# Patient Record
Sex: FEMALE | Race: WHITE | NOT HISPANIC OR LATINO | Employment: OTHER | URBAN - METROPOLITAN AREA
[De-identification: names, ages, dates, MRNs, and addresses within clinical notes are randomized per-mention and may not be internally consistent; named-entity substitution may affect disease eponyms.]

---

## 2023-08-30 ENCOUNTER — HOSPITAL ENCOUNTER (INPATIENT)
Facility: HOSPITAL | Age: 88
LOS: 2 days | Discharge: NURSING, REHAB OR LONG-TERM CARE | DRG: 070 | End: 2023-09-02
Attending: EMERGENCY MEDICINE | Admitting: INTERNAL MEDICINE
Payer: COMMERCIAL

## 2023-08-30 DIAGNOSIS — T79.6XXA TRAUMATIC RHABDOMYOLYSIS, INITIAL ENCOUNTER (H): ICD-10-CM

## 2023-08-30 DIAGNOSIS — R63.6 UNDERWEIGHT: Primary | ICD-10-CM

## 2023-08-30 DIAGNOSIS — L89.109 PRESSURE INJURY OF SKIN OF BACK, UNSPECIFIED INJURY STAGE: ICD-10-CM

## 2023-08-30 DIAGNOSIS — I48.91 ATRIAL FIBRILLATION WITH RVR (H): ICD-10-CM

## 2023-08-30 DIAGNOSIS — R93.89 ABNORMAL CT OF THE CHEST: ICD-10-CM

## 2023-08-30 DIAGNOSIS — K44.9 HIATAL HERNIA: ICD-10-CM

## 2023-08-30 DIAGNOSIS — E04.1 THYROID NODULE: ICD-10-CM

## 2023-08-30 DIAGNOSIS — D72.829 LEUKOCYTOSIS, UNSPECIFIED TYPE: ICD-10-CM

## 2023-08-30 DIAGNOSIS — K80.20 GALLSTONES: ICD-10-CM

## 2023-08-30 LAB
ABO/RH(D): NORMAL
ALBUMIN SERPL BCG-MCNC: 4 G/DL (ref 3.5–5.2)
ALP SERPL-CCNC: 62 U/L (ref 35–104)
ALT SERPL W P-5'-P-CCNC: 26 U/L (ref 0–50)
ANION GAP SERPL CALCULATED.3IONS-SCNC: 13 MMOL/L (ref 7–15)
ANTIBODY SCREEN: NEGATIVE
AST SERPL W P-5'-P-CCNC: 94 U/L (ref 0–45)
BASOPHILS # BLD AUTO: 0 10E3/UL (ref 0–0.2)
BASOPHILS NFR BLD AUTO: 0 %
BILIRUB DIRECT SERPL-MCNC: 0.5 MG/DL (ref 0–0.3)
BILIRUB SERPL-MCNC: 2.3 MG/DL
BUN SERPL-MCNC: 73 MG/DL (ref 8–23)
CALCIUM SERPL-MCNC: 9.4 MG/DL (ref 8.2–9.6)
CHLORIDE SERPL-SCNC: 105 MMOL/L (ref 98–107)
CREAT SERPL-MCNC: 1.09 MG/DL (ref 0.51–0.95)
DEPRECATED HCO3 PLAS-SCNC: 26 MMOL/L (ref 22–29)
EOSINOPHIL # BLD AUTO: 0 10E3/UL (ref 0–0.7)
EOSINOPHIL NFR BLD AUTO: 0 %
ERYTHROCYTE [DISTWIDTH] IN BLOOD BY AUTOMATED COUNT: 15 % (ref 10–15)
GFR SERPL CREATININE-BSD FRML MDRD: 47 ML/MIN/1.73M2
GLUCOSE SERPL-MCNC: 120 MG/DL (ref 70–99)
HCT VFR BLD AUTO: 41.8 % (ref 35–47)
HGB BLD-MCNC: 13.4 G/DL (ref 11.7–15.7)
HOLD SPECIMEN: NORMAL
IMM GRANULOCYTES # BLD: 0.1 10E3/UL
IMM GRANULOCYTES NFR BLD: 1 %
LYMPHOCYTES # BLD AUTO: 0.4 10E3/UL (ref 0.8–5.3)
LYMPHOCYTES NFR BLD AUTO: 3 %
MAGNESIUM SERPL-MCNC: 2.6 MG/DL (ref 1.7–2.3)
MCH RBC QN AUTO: 30 PG (ref 26.5–33)
MCHC RBC AUTO-ENTMCNC: 32.1 G/DL (ref 31.5–36.5)
MCV RBC AUTO: 94 FL (ref 78–100)
MONOCYTES # BLD AUTO: 1 10E3/UL (ref 0–1.3)
MONOCYTES NFR BLD AUTO: 7 %
NEUTROPHILS # BLD AUTO: 11.6 10E3/UL (ref 1.6–8.3)
NEUTROPHILS NFR BLD AUTO: 89 %
NRBC # BLD AUTO: 0 10E3/UL
NRBC BLD AUTO-RTO: 0 /100
PLATELET # BLD AUTO: 227 10E3/UL (ref 150–450)
POTASSIUM SERPL-SCNC: 4.6 MMOL/L (ref 3.4–5.3)
PROT SERPL-MCNC: 6.9 G/DL (ref 6.4–8.3)
RBC # BLD AUTO: 4.46 10E6/UL (ref 3.8–5.2)
SODIUM SERPL-SCNC: 144 MMOL/L (ref 136–145)
SPECIMEN EXPIRATION DATE: NORMAL
TROPONIN T SERPL HS-MCNC: 60 NG/L
WBC # BLD AUTO: 13.1 10E3/UL (ref 4–11)

## 2023-08-30 PROCEDURE — 84484 ASSAY OF TROPONIN QUANT: CPT | Performed by: EMERGENCY MEDICINE

## 2023-08-30 PROCEDURE — 82550 ASSAY OF CK (CPK): CPT | Performed by: EMERGENCY MEDICINE

## 2023-08-30 PROCEDURE — 86850 RBC ANTIBODY SCREEN: CPT | Performed by: EMERGENCY MEDICINE

## 2023-08-30 PROCEDURE — 93005 ELECTROCARDIOGRAM TRACING: CPT | Performed by: EMERGENCY MEDICINE

## 2023-08-30 PROCEDURE — 93005 ELECTROCARDIOGRAM TRACING: CPT | Performed by: STUDENT IN AN ORGANIZED HEALTH CARE EDUCATION/TRAINING PROGRAM

## 2023-08-30 PROCEDURE — 82248 BILIRUBIN DIRECT: CPT | Performed by: EMERGENCY MEDICINE

## 2023-08-30 PROCEDURE — 83735 ASSAY OF MAGNESIUM: CPT | Performed by: EMERGENCY MEDICINE

## 2023-08-30 PROCEDURE — 80053 COMPREHEN METABOLIC PANEL: CPT | Performed by: EMERGENCY MEDICINE

## 2023-08-30 PROCEDURE — 85025 COMPLETE CBC W/AUTO DIFF WBC: CPT | Performed by: EMERGENCY MEDICINE

## 2023-08-30 PROCEDURE — 36415 COLL VENOUS BLD VENIPUNCTURE: CPT | Performed by: EMERGENCY MEDICINE

## 2023-08-30 PROCEDURE — 82310 ASSAY OF CALCIUM: CPT | Performed by: EMERGENCY MEDICINE

## 2023-08-30 RX ORDER — METOPROLOL TARTRATE 1 MG/ML
5 INJECTION, SOLUTION INTRAVENOUS ONCE
Status: COMPLETED | OUTPATIENT
Start: 2023-08-31 | End: 2023-08-31

## 2023-08-30 ASSESSMENT — ACTIVITIES OF DAILY LIVING (ADL): ADLS_ACUITY_SCORE: 35

## 2023-08-31 ENCOUNTER — APPOINTMENT (OUTPATIENT)
Dept: CT IMAGING | Facility: HOSPITAL | Age: 88
DRG: 070 | End: 2023-08-31
Attending: EMERGENCY MEDICINE
Payer: COMMERCIAL

## 2023-08-31 ENCOUNTER — APPOINTMENT (OUTPATIENT)
Dept: CARDIOLOGY | Facility: HOSPITAL | Age: 88
DRG: 070 | End: 2023-08-31
Attending: INTERNAL MEDICINE
Payer: COMMERCIAL

## 2023-08-31 ENCOUNTER — APPOINTMENT (OUTPATIENT)
Dept: PHYSICAL THERAPY | Facility: HOSPITAL | Age: 88
DRG: 070 | End: 2023-08-31
Attending: HOSPITALIST
Payer: COMMERCIAL

## 2023-08-31 ENCOUNTER — APPOINTMENT (OUTPATIENT)
Dept: OCCUPATIONAL THERAPY | Facility: HOSPITAL | Age: 88
DRG: 070 | End: 2023-08-31
Attending: HOSPITALIST
Payer: COMMERCIAL

## 2023-08-31 ENCOUNTER — APPOINTMENT (OUTPATIENT)
Dept: ULTRASOUND IMAGING | Facility: HOSPITAL | Age: 88
DRG: 070 | End: 2023-08-31
Attending: INTERNAL MEDICINE
Payer: COMMERCIAL

## 2023-08-31 PROBLEM — R93.89 ABNORMAL CT OF THE CHEST: Status: ACTIVE | Noted: 2023-08-31

## 2023-08-31 PROBLEM — T79.6XXA TRAUMATIC RHABDOMYOLYSIS, INITIAL ENCOUNTER (H): Status: ACTIVE | Noted: 2023-08-31

## 2023-08-31 PROBLEM — K44.9 HIATAL HERNIA: Status: ACTIVE | Noted: 2023-08-31

## 2023-08-31 PROBLEM — K80.20 GALLSTONES: Status: ACTIVE | Noted: 2023-08-31

## 2023-08-31 PROBLEM — E04.1 THYROID NODULE: Status: ACTIVE | Noted: 2023-08-31

## 2023-08-31 PROBLEM — I48.91 ATRIAL FIBRILLATION WITH RVR (H): Status: ACTIVE | Noted: 2023-08-31

## 2023-08-31 PROBLEM — D72.829 LEUKOCYTOSIS, UNSPECIFIED TYPE: Status: ACTIVE | Noted: 2023-08-31

## 2023-08-31 PROBLEM — L89.109: Status: ACTIVE | Noted: 2023-08-31

## 2023-08-31 LAB
ALBUMIN SERPL BCG-MCNC: 3.1 G/DL (ref 3.5–5.2)
ALBUMIN UR-MCNC: 30 MG/DL
ALP SERPL-CCNC: 44 U/L (ref 35–104)
ALT SERPL W P-5'-P-CCNC: 26 U/L (ref 0–50)
ANION GAP SERPL CALCULATED.3IONS-SCNC: 10 MMOL/L (ref 7–15)
ANION GAP SERPL CALCULATED.3IONS-SCNC: 9 MMOL/L (ref 7–15)
APPEARANCE UR: CLEAR
AST SERPL W P-5'-P-CCNC: 85 U/L (ref 0–45)
BACTERIA #/AREA URNS HPF: ABNORMAL /HPF
BASOPHILS # BLD AUTO: 0 10E3/UL (ref 0–0.2)
BASOPHILS NFR BLD AUTO: 0 %
BILIRUB SERPL-MCNC: 1.7 MG/DL
BILIRUB UR QL STRIP: NEGATIVE
BUN SERPL-MCNC: 66.1 MG/DL (ref 8–23)
BUN SERPL-MCNC: 69.1 MG/DL (ref 8–23)
CALCIUM SERPL-MCNC: 8.4 MG/DL (ref 8.2–9.6)
CALCIUM SERPL-MCNC: 8.6 MG/DL (ref 8.2–9.6)
CHLORIDE SERPL-SCNC: 106 MMOL/L (ref 98–107)
CHLORIDE SERPL-SCNC: 111 MMOL/L (ref 98–107)
CK SERPL-CCNC: 1969 U/L (ref 26–192)
CK SERPL-CCNC: 2572 U/L (ref 26–192)
COLOR UR AUTO: YELLOW
CREAT SERPL-MCNC: 0.88 MG/DL (ref 0.51–0.95)
CREAT SERPL-MCNC: 1.04 MG/DL (ref 0.51–0.95)
DEPRECATED HCO3 PLAS-SCNC: 24 MMOL/L (ref 22–29)
DEPRECATED HCO3 PLAS-SCNC: 27 MMOL/L (ref 22–29)
EOSINOPHIL # BLD AUTO: 0 10E3/UL (ref 0–0.7)
EOSINOPHIL NFR BLD AUTO: 0 %
ERYTHROCYTE [DISTWIDTH] IN BLOOD BY AUTOMATED COUNT: 14.9 % (ref 10–15)
GFR SERPL CREATININE-BSD FRML MDRD: 49 ML/MIN/1.73M2
GFR SERPL CREATININE-BSD FRML MDRD: 60 ML/MIN/1.73M2
GLUCOSE SERPL-MCNC: 105 MG/DL (ref 70–99)
GLUCOSE SERPL-MCNC: 124 MG/DL (ref 70–99)
GLUCOSE UR STRIP-MCNC: NEGATIVE MG/DL
HCT VFR BLD AUTO: 38.3 % (ref 35–47)
HGB BLD-MCNC: 12.1 G/DL (ref 11.7–15.7)
HGB UR QL STRIP: ABNORMAL
IMM GRANULOCYTES # BLD: 0 10E3/UL
IMM GRANULOCYTES NFR BLD: 0 %
KETONES UR STRIP-MCNC: ABNORMAL MG/DL
LACTATE SERPL-SCNC: 1.5 MMOL/L (ref 0.7–2)
LEUKOCYTE ESTERASE UR QL STRIP: ABNORMAL
LVEF ECHO: NORMAL
LYMPHOCYTES # BLD AUTO: 0.5 10E3/UL (ref 0.8–5.3)
LYMPHOCYTES NFR BLD AUTO: 6 %
MAGNESIUM SERPL-MCNC: 2.4 MG/DL (ref 1.7–2.3)
MCH RBC QN AUTO: 29.9 PG (ref 26.5–33)
MCHC RBC AUTO-ENTMCNC: 31.6 G/DL (ref 31.5–36.5)
MCV RBC AUTO: 95 FL (ref 78–100)
MONOCYTES # BLD AUTO: 1 10E3/UL (ref 0–1.3)
MONOCYTES NFR BLD AUTO: 10 %
MUCOUS THREADS #/AREA URNS LPF: PRESENT /LPF
NEUTROPHILS # BLD AUTO: 8 10E3/UL (ref 1.6–8.3)
NEUTROPHILS NFR BLD AUTO: 84 %
NITRATE UR QL: POSITIVE
NRBC # BLD AUTO: 0 10E3/UL
NRBC BLD AUTO-RTO: 0 /100
PH UR STRIP: 5.5 [PH] (ref 5–7)
PLATELET # BLD AUTO: 174 10E3/UL (ref 150–450)
POTASSIUM SERPL-SCNC: 4.2 MMOL/L (ref 3.4–5.3)
POTASSIUM SERPL-SCNC: 4.4 MMOL/L (ref 3.4–5.3)
PROT SERPL-MCNC: 5.6 G/DL (ref 6.4–8.3)
RBC # BLD AUTO: 4.05 10E6/UL (ref 3.8–5.2)
RBC URINE: 9 /HPF
SODIUM SERPL-SCNC: 142 MMOL/L (ref 136–145)
SODIUM SERPL-SCNC: 145 MMOL/L (ref 136–145)
SP GR UR STRIP: >1.03 (ref 1–1.03)
SQUAMOUS EPITHELIAL: 1 /HPF
TROPONIN T SERPL HS-MCNC: 55 NG/L
UROBILINOGEN UR STRIP-MCNC: <2 MG/DL
WBC # BLD AUTO: 9.6 10E3/UL (ref 4–11)
WBC URINE: 109 /HPF

## 2023-08-31 PROCEDURE — 97162 PT EVAL MOD COMPLEX 30 MIN: CPT | Mod: GP

## 2023-08-31 PROCEDURE — 84484 ASSAY OF TROPONIN QUANT: CPT | Performed by: EMERGENCY MEDICINE

## 2023-08-31 PROCEDURE — 93306 TTE W/DOPPLER COMPLETE: CPT

## 2023-08-31 PROCEDURE — 93005 ELECTROCARDIOGRAM TRACING: CPT | Performed by: HOSPITALIST

## 2023-08-31 PROCEDURE — 250N000009 HC RX 250: Performed by: EMERGENCY MEDICINE

## 2023-08-31 PROCEDURE — 76705 ECHO EXAM OF ABDOMEN: CPT

## 2023-08-31 PROCEDURE — 36415 COLL VENOUS BLD VENIPUNCTURE: CPT | Performed by: EMERGENCY MEDICINE

## 2023-08-31 PROCEDURE — 93306 TTE W/DOPPLER COMPLETE: CPT | Mod: 26 | Performed by: INTERNAL MEDICINE

## 2023-08-31 PROCEDURE — G0463 HOSPITAL OUTPT CLINIC VISIT: HCPCS | Mod: 25

## 2023-08-31 PROCEDURE — 76536 US EXAM OF HEAD AND NECK: CPT

## 2023-08-31 PROCEDURE — 258N000003 HC RX IP 258 OP 636: Performed by: INTERNAL MEDICINE

## 2023-08-31 PROCEDURE — 85025 COMPLETE CBC W/AUTO DIFF WBC: CPT | Performed by: INTERNAL MEDICINE

## 2023-08-31 PROCEDURE — 97167 OT EVAL HIGH COMPLEX 60 MIN: CPT | Mod: GO

## 2023-08-31 PROCEDURE — 87186 SC STD MICRODIL/AGAR DIL: CPT | Performed by: EMERGENCY MEDICINE

## 2023-08-31 PROCEDURE — 82550 ASSAY OF CK (CPK): CPT | Performed by: HOSPITALIST

## 2023-08-31 PROCEDURE — 83735 ASSAY OF MAGNESIUM: CPT | Performed by: HOSPITALIST

## 2023-08-31 PROCEDURE — 97530 THERAPEUTIC ACTIVITIES: CPT | Mod: GP

## 2023-08-31 PROCEDURE — 258N000003 HC RX IP 258 OP 636: Performed by: EMERGENCY MEDICINE

## 2023-08-31 PROCEDURE — 83605 ASSAY OF LACTIC ACID: CPT | Performed by: EMERGENCY MEDICINE

## 2023-08-31 PROCEDURE — 120N000001 HC R&B MED SURG/OB

## 2023-08-31 PROCEDURE — 87040 BLOOD CULTURE FOR BACTERIA: CPT | Performed by: EMERGENCY MEDICINE

## 2023-08-31 PROCEDURE — 72125 CT NECK SPINE W/O DYE: CPT

## 2023-08-31 PROCEDURE — 999N000104 CT THORACIC SPINE RECONSTRUCTED

## 2023-08-31 PROCEDURE — 80053 COMPREHEN METABOLIC PANEL: CPT | Performed by: INTERNAL MEDICINE

## 2023-08-31 PROCEDURE — 36415 COLL VENOUS BLD VENIPUNCTURE: CPT | Performed by: INTERNAL MEDICINE

## 2023-08-31 PROCEDURE — 81001 URINALYSIS AUTO W/SCOPE: CPT | Performed by: EMERGENCY MEDICINE

## 2023-08-31 PROCEDURE — 36415 COLL VENOUS BLD VENIPUNCTURE: CPT | Performed by: HOSPITALIST

## 2023-08-31 PROCEDURE — 99223 1ST HOSP IP/OBS HIGH 75: CPT | Performed by: INTERNAL MEDICINE

## 2023-08-31 PROCEDURE — 74177 CT ABD & PELVIS W/CONTRAST: CPT

## 2023-08-31 PROCEDURE — 250N000011 HC RX IP 250 OP 636: Mod: JZ | Performed by: EMERGENCY MEDICINE

## 2023-08-31 PROCEDURE — 250N000013 HC RX MED GY IP 250 OP 250 PS 637: Performed by: INTERNAL MEDICINE

## 2023-08-31 PROCEDURE — 999N000104 CT LUMBAR SPINE RECONSTRUCTED

## 2023-08-31 PROCEDURE — 250N000011 HC RX IP 250 OP 636: Mod: JZ | Performed by: INTERNAL MEDICINE

## 2023-08-31 PROCEDURE — 250N000013 HC RX MED GY IP 250 OP 250 PS 637: Performed by: HOSPITALIST

## 2023-08-31 PROCEDURE — 70450 CT HEAD/BRAIN W/O DYE: CPT

## 2023-08-31 RX ORDER — ONDANSETRON 2 MG/ML
4 INJECTION INTRAMUSCULAR; INTRAVENOUS EVERY 6 HOURS PRN
Status: DISCONTINUED | OUTPATIENT
Start: 2023-08-31 | End: 2023-09-02 | Stop reason: HOSPADM

## 2023-08-31 RX ORDER — LIDOCAINE 40 MG/G
CREAM TOPICAL
Status: DISCONTINUED | OUTPATIENT
Start: 2023-08-31 | End: 2023-09-02 | Stop reason: HOSPADM

## 2023-08-31 RX ORDER — MULTIPLE VITAMINS W/ MINERALS TAB 9MG-400MCG
1 TAB ORAL DAILY
Status: DISCONTINUED | OUTPATIENT
Start: 2023-08-31 | End: 2023-09-02 | Stop reason: HOSPADM

## 2023-08-31 RX ORDER — ACETAMINOPHEN 325 MG/1
650 TABLET ORAL EVERY 6 HOURS PRN
Status: DISCONTINUED | OUTPATIENT
Start: 2023-08-31 | End: 2023-09-02 | Stop reason: HOSPADM

## 2023-08-31 RX ORDER — ONDANSETRON 4 MG/1
4 TABLET, ORALLY DISINTEGRATING ORAL EVERY 6 HOURS PRN
Status: DISCONTINUED | OUTPATIENT
Start: 2023-08-31 | End: 2023-09-02 | Stop reason: HOSPADM

## 2023-08-31 RX ORDER — IOPAMIDOL 755 MG/ML
75 INJECTION, SOLUTION INTRAVASCULAR ONCE
Status: COMPLETED | OUTPATIENT
Start: 2023-08-31 | End: 2023-08-31

## 2023-08-31 RX ORDER — AMOXICILLIN 250 MG
1 CAPSULE ORAL 2 TIMES DAILY PRN
Status: DISCONTINUED | OUTPATIENT
Start: 2023-08-31 | End: 2023-09-02 | Stop reason: HOSPADM

## 2023-08-31 RX ORDER — METOPROLOL SUCCINATE 25 MG/1
25 TABLET, EXTENDED RELEASE ORAL DAILY
Status: DISCONTINUED | OUTPATIENT
Start: 2023-09-01 | End: 2023-09-01

## 2023-08-31 RX ORDER — SODIUM CHLORIDE 9 MG/ML
INJECTION, SOLUTION INTRAVENOUS CONTINUOUS
Status: ACTIVE | OUTPATIENT
Start: 2023-08-31 | End: 2023-09-01

## 2023-08-31 RX ORDER — AMOXICILLIN 250 MG
2 CAPSULE ORAL 2 TIMES DAILY PRN
Status: DISCONTINUED | OUTPATIENT
Start: 2023-08-31 | End: 2023-09-02 | Stop reason: HOSPADM

## 2023-08-31 RX ORDER — ZINC SULFATE 50(220)MG
220 CAPSULE ORAL DAILY
Status: DISCONTINUED | OUTPATIENT
Start: 2023-08-31 | End: 2023-09-02 | Stop reason: HOSPADM

## 2023-08-31 RX ORDER — ENOXAPARIN SODIUM 100 MG/ML
30 INJECTION SUBCUTANEOUS EVERY 24 HOURS
Status: DISCONTINUED | OUTPATIENT
Start: 2023-08-31 | End: 2023-08-31

## 2023-08-31 RX ADMIN — METOPROLOL TARTRATE 5 MG: 5 INJECTION INTRAVENOUS at 00:03

## 2023-08-31 RX ADMIN — SODIUM CHLORIDE: 9 INJECTION, SOLUTION INTRAVENOUS at 04:35

## 2023-08-31 RX ADMIN — ZINC SULFATE 220 MG (50 MG) CAPSULE 220 MG: CAPSULE at 14:11

## 2023-08-31 RX ADMIN — SODIUM CHLORIDE 500 ML: 9 INJECTION, SOLUTION INTRAVENOUS at 00:07

## 2023-08-31 RX ADMIN — APIXABAN 2.5 MG: 2.5 TABLET, FILM COATED ORAL at 20:43

## 2023-08-31 RX ADMIN — METOPROLOL SUCCINATE 12.5 MG: 25 TABLET, EXTENDED RELEASE ORAL at 09:04

## 2023-08-31 RX ADMIN — IOPAMIDOL 75 ML: 755 INJECTION, SOLUTION INTRAVENOUS at 01:34

## 2023-08-31 RX ADMIN — Medication 50 MG: at 14:11

## 2023-08-31 RX ADMIN — ENOXAPARIN SODIUM 30 MG: 30 INJECTION SUBCUTANEOUS at 09:04

## 2023-08-31 RX ADMIN — Medication 1 TABLET: at 14:11

## 2023-08-31 ASSESSMENT — ACTIVITIES OF DAILY LIVING (ADL)
ADLS_ACUITY_SCORE: 35
ADLS_ACUITY_SCORE: 49
DEPENDENT_IADLS:: CLEANING;COOKING;LAUNDRY;SHOPPING;TRANSPORTATION
ADLS_ACUITY_SCORE: 35
ADLS_ACUITY_SCORE: 49
ADLS_ACUITY_SCORE: 35
ADLS_ACUITY_SCORE: 35
ADLS_ACUITY_SCORE: 49
ADLS_ACUITY_SCORE: 35
ADLS_ACUITY_SCORE: 35
ADLS_ACUITY_SCORE: 37
ADLS_ACUITY_SCORE: 35
ADLS_ACUITY_SCORE: 35

## 2023-08-31 NOTE — ED NOTES
Pt was found to be covered in feces. Pt shoes had layer of stool in them. Toes caked in thick layer of stool and between toes and under toenails. Pt cleaned with soap and wash clothes to loosen feces. Pt received new underwear. Two large sores were found on patients coccyx and left buttock. Barrier cream applied. Pt repositioned and provided all new clothes. Straight catheterization attempted x3, unsuccessful. Provider notified.

## 2023-08-31 NOTE — PROGRESS NOTES
Hospital Medicine Update Progress Note    Patient admitted after midnight, please see H+P for full details.    Patient is admitted with atrial fibrillation with RVR and mild rhabdomyolysis after being found sitting on the toilet for an unknown duration.    Patient is on IV fluids for mild rhabdo, renal function stable.  Continue IV fluids for now.    Heart rate is better controlled as well, appears to still be in atrial fibrillation.  We will continue monitor for the next 24 hours.  TTE shows intact EF and some mild AR, MR, TR, and moderate pulmonary hypertension.  We will start on low-dose Eliquis given age and weight.  Continue on metoprolol.    Also given lymphadenopathy findings on CT, spoke with patient and stepson.  Given patient's age felt that monitoring this is probably appropriate rather than pursuing any kind of aggressive work-up.  Explained that if anything is found she will need a biopsy and the treatment if this is aggressive could do more harm than good.  Patient's stepson is in agreement.    Was seen by PT, recommend TCU.    Expect will likely be medically ready by tomorrow.

## 2023-08-31 NOTE — ED NOTES
Bed: JNED-06  Expected date: 8/30/23  Expected time: 9:03 PM  Means of arrival: Ambulance  Comments:  [facility] - failure to thrive

## 2023-08-31 NOTE — PROGRESS NOTES
08/31/23 1415   Appointment Info   Signing Clinician's Name / Credentials (PT) Suad Fleming DPT   Living Environment   People in Home alone   Current Living Arrangements house  ([facility])   Home Accessibility no concerns   Transportation Anticipated family or friend will provide;health plan transportation   Self-Care   Usual Activity Tolerance moderate   Current Activity Tolerance poor   Equipment Currently Used at Home walker, rolling   General Information   Onset of Illness/Injury or Date of Surgery 08/30/23   Referring Physician Michael Biggs MD   Patient/Family Therapy Goals Statement (PT) none   Pertinent History of Current Problem (include personal factors and/or comorbidities that impact the POC) 89 year old female admitted on 8/30/2023. She is admitted for failure to thrive with concern for intermittent A-fib with RVR, rhabdomyolysis and pressure ulcers on the thoracic spine.   Existing Precautions/Restrictions fall   Strength (Manual Muscle Testing)   Strength (Manual Muscle Testing) Deficits observed during functional mobility   Bed Mobility   Bed Mobility supine-sit   Supine-Sit [place] (Bed Mobility) moderate assist (50% patient effort)   Bed Mobility Limitations decreased ability to use arms for pushing/pulling;decreased ability to use legs for bridging/pushing;impaired ability to control trunk for mobility   Transfers   Transfers sit-stand transfer   Sit-Stand Transfer   Sit-Stand [place] (Transfers) minimum assist (75% patient effort)   Assistive Device (Sit-Stand Transfers)   (Hand hold assist)   Gait/Stairs (Locomotion)   [place] Level (Gait) minimum assist (75% patient effort)   Assistive Device (Gait) walker, front-wheeled   Distance in Feet 5'  (bed > recliner)   Pattern (Gait) step-to   Deviations/Abnormal Patterns (Gait) gait speed decreased   Clinical Impression   Criteria for Skilled Therapeutic Intervention Yes, treatment indicated   PT Diagnosis (PT) Impaired  functional mobility   Influenced by the following impairments Weakness, fatigue   Functional limitations due to impairments Impaired strength, transfers, gait   Clinical Presentation (PT Evaluation Complexity) Stable/Uncomplicated   Clinical Presentation Rationale Presents as diagnosed   Clinical Decision Making (Complexity) moderate complexity   Planned Therapy Interventions (PT) balance training;bed mobility training;gait training;home exercise program;strengthening;transfer training   Anticipated Equipment Needs at Discharge (PT) walker, rolling   PT Total Evaluation Time   PT Eval, Moderate Complexity Minutes (94276) 10   Physical Therapy Goals   PT Frequency Daily   PT Predicted Duration/Target Date for Goal Attainment 09/07/23   PT Goals Bed Mobility;Transfers;Gait   PT: Bed Mobility Supervision/stand-by assist;Supine to/from sit   PT: Transfers Supervision/stand-by assist;Sit to/from stand;Bed to/from chair;Assistive device   PT: Gait Rolling walker;50 feet  (CGA)   PT Discharge Planning   PT Plan progress transfers, amb as ifeoma, LE ex   PT Discharge Recommendation (DC Rec) Transitional Care Facility   PT Rationale for DC Rec Unable to ambulate any distance yet.   PT Brief overview of current status Amb 5' from bed > recliner with min A and FWW. Limited by weakness.   Total Session Time   Total Session Time (sum of timed and untimed services) 10       Suad Fleming, PT, DPT  8/31/2023

## 2023-08-31 NOTE — H&P
"[facility]    History and Physical - Hospitalist Service       Date of Admission:  8/30/2023    Assessment & Plan      Elham Manning is a 89 year old female admitted on 8/30/2023. She is admitted for failure to thrive with concern for intermittent A-fib with RVR, rhabdomyolysis and pressure ulcers on the thoracic spine.  Patient has not been to the doctor in years    Acute encephalopathy/confusion  -- Neurochecks  --CT head no acute findings.      A-fib with RVR  Elevated troponin  -- Rate controlled after dose of metoprolol IV in the ER  --Continue p.o. metoprolol 12.5 mg  --Echocardiogram in the morning  --Although troponin is elevated it is not trending up.  --Will defer to daytime physician for with a cardiology consult pending results of echocardiogram      Rhabdomyolysis  -- Continue IV fluids  Trend serum creatinine    Pressure ulcer thoracic spine  --not visualized by   --Wound care consult      CT chest with soft tissue density at thoracic inlet concerning for adenopathy versus lymphoproliferative etiology versus lymphoma  -- We will defer to daytime physician for oncology consult admitted with family.  No family at bedside at the time of interview.       Moderate to severe protein calorie malnutrition with cachexia  --Nutrition consult      Hyperbilirubinemia  Cholelithiasis noted on CT  --Trend for now  --Liver ultrasound      Left thyroid nodule that on CT  -- thyroid sono    Leukocytosis  --We will trend for now         Diet: Combination Diet Regular Diet Adult  DVT Prophylaxis: Enoxaparin (Lovenox) SQ  [place]ummings Catheter: Not present  Lines: None     Cardiac Monitoring: ACTIVE order. Indication: Tachyarrhythmias, acute (48 hours)  Code Status: Full Code    Clinically Significant Risk Factors Present on Admission                       # Cachexia: Estimated body mass index is 15.94 kg/m  as calculated from the following:    Height as of this encounter: 1.6 m (5' 3\").    Weight as of " this encounter: 40.8 kg (90 lb).              Disposition Plan      Expected Discharge Date: 09/02/2023                  Abril Cabral MD  Hospitalist Service  [facility]  Securely message with [employer] (more info)  Text page via [employer] Paging/Directory     ______________________________________________________________________    Chief Complaint   Patient found sitting on the toilet in an awkward position    History is obtained from the ER provider and chart      History of Present Illness   Elham Manning is a 89 year old female who has not been to a doctor in ages for fear of having her 's license revoked was brought to the emergency room secondary to being found sitting on the toilet in an awkward position.  Family had not heard from her in 2 days and they went over to her house and found her sitting on the toilet in an uncomfortable/awkward position.    On arrival here she is confused but oriented to person/self, found to have fecal matter on her legs in between her toes.  It is not able to provide any meaningful history but she is oriented to herself.  She does not know why she came to the hospital.  Per ER provider she was noted to have a pressure ulcer on her thoracic spine felt to be secondary to where she was leaning against a wall/shower when she was found.      Past Medical History    History reviewed. No pertinent past medical history.  Unable to assess secondary to clinical condition    Past Surgical History   History reviewed. No pertinent surgical history.    Prior to Admission Medications   None        Social History   I have reviewed this patient's social history and updated it with pertinent information if needed.         Family History     Unable to obtain due to: Medical condition      Allergies   No Known Allergies     Physical Exam   Vital Signs: Temp: 98.4  F (36.9  C) Temp src: Oral BP: 116/82 Pulse: 92   Resp: 23 SpO2: 96 % O2 Device: None (Room air)    Weight:  90 lbs 0 oz      General: Awake and alert, cachectic frail chronically ill looking  Eyes: Pupils reactive to light  HENT: Atraumatic, oral mucosa moist  Neck: No masses, or swelling  Pulmonary: Good air entry, clear to auscultation  CVS: Heart sounds 1 and 2 present, regular rate with irregular rhythm, no murmur  GI/ Abdomen: Soft , not tender , not distended, bowel sounds ++  : No dorman   ROXANNE: Normal inspection, no muscle spasm  Skin: No rash, skin intact  Extremities: Edema ++, with scabs on the lower extremities  Neuro: Alert and oriented, self only follows command,   Psych: Unable to assess      Medical Decision Making       75 MINUTES SPENT BY ME on the date of service doing chart review, history, exam, documentation & further activities per the note.      Data     I have personally reviewed the following data over the past 24 hrs:    13.1 (H)  \   13.4   / 227     144 105 73.0 (H) /  120 (H)   4.6 26 1.09 (H) \     ALT: 26 AST: 94 (H) AP: 62 TBILI: 2.3 (H)   ALB: 4.0 TOT PROTEIN: 6.9 LIPASE: N/A     Trop: 55 (H) BNP: N/A     Procal: N/A CRP: N/A Lactic Acid: 1.5       Imaging results reviewed over the past 24 hrs:   Recent Results (from the past 24 hour(s))   Head CT w/o contrast    Narrative    EXAM: CT HEAD W/O CONTRAST  LOCATION: [facility]  DATE: 8/31/2023    INDICATION: Traumatic injury. Confusion.  COMPARISON: None.  TECHNIQUE: Routine CT Head without IV contrast. Multiplanar reformats. Dose reduction techniques were used.    FINDINGS:  INTRACRANIAL CONTENTS: No intracranial hemorrhage, extraaxial collection, or mass effect.  No CT evidence of acute infarct. 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 Severe presumed chronic small vessel   ischemic changes. Mild to moderate generalized volume loss. No hydrocephalus.     VISUALIZED ORBITS/SINUSES/MASTOIDS: Prior bilateral cataract surgery. Visualized portions of the orbits are otherwise  unremarkable. No paranasal sinus mucosal disease. No middle ear or mastoid effusion.    BONES/SOFT TISSUES: No acute abnormality.      Impression    IMPRESSION:  1.  No CT evidence for acute intracranial process.  2.  Chronic changes as above.   Cervical spine CT w/o contrast    Narrative    EXAM: CT THORACIC SPINE RECONSTRUCTED, CT LUMBAR SPINE RECONSTRUCTED, CT CERVICAL SPINE W/O CONTRAST  LOCATION: [facility]  DATE: 8/31/2023    INDICATION: Traumatic injury. Fall.  COMPARISON: None.  TECHNIQUE:  1) Routine CT Cervical Spine without IV contrast. Multiplanar reformats. Dose reduction techniques were used.   2) Dedicated axial, sagittal, and coronal images of the Thoracic Spine were generated utilizing CT chest source data and are separately reviewed. Dose reduction techniques were used.   3) Dedicated axial, sagittal, and coronal images of the Lumbar Spine were generated utilizing CT abdomen pelvis source data and are separately reviewed. Dose reduction techniques were used.     FINDINGS:    CERVICAL SPINE CT:  VERTEBRA: Trace degenerative anterolisthesis of C6 on C7. Vertebral body heights are preserved. No evidence of an acute displaced fracture. Diffuse bony demineralization.     CANAL/FORAMINA: Multilevel degenerative changes without high-grade spinal canal stenosis. Multilevel bilateral neural foraminal narrowing with at least moderate bilateral stenosis at C3-C4 and C5-C6.    PARASPINAL: No prevertebral hematoma.     THORACIC SPINE CT:  VERTEBRA: Accentuation of the thoracic spine kyphosis. Multilevel bridging anterior osteophytes with ankylosis of the majority of the thoracic spine. Diffuse bony demineralization. No definite acute displaced fracture.     CANAL/FORAMINA: No high-grade spinal canal stenosis. Scattered mild bilateral neural foraminal narrowing.    PARASPINAL: See separately dictated chest CT.    LUMBAR SPINE CT:  VERTEBRA: Slight left apex curvature of the lumbar spine.  Grade I retrolisthesis of L2 on L3 and anterolisthesis of L5 on S1. Vertebral body heights are preserved. The bones are diffusely demineralized. No evidence of an acute displaced fracture.     CANAL/FORAMINA: Mild degenerative changes of the lumbar spine, without high-grade spinal canal or neural foraminal narrowing.    PARASPINAL: See separately dictated CT abdomen and pelvis.      Impression    IMPRESSION:  CERVICAL SPINE CT:  1.  No evidence of an acute displaced fracture of the cervical spine.    THORACIC SPINE CT:  1.  No evidence of an acute displaced fracture of the thoracic spine.    LUMBAR SPINE CT:  1.  No evidence of an acute displaced fracture of the lumbar spine.   CT Chest/Abdomen/Pelvis w Contrast    Narrative    EXAM: CT CHEST/ABDOMEN/PELVIS W CONTRAST  LOCATION: [facility]  DATE: 8/31/2023    INDICATION: fall. confusion, elevated HR, WBC  COMPARISON: None.  TECHNIQUE: CT scan of the chest, abdomen, and pelvis was performed following injection of IV contrast. Multiplanar reformats were obtained. Dose reduction techniques were used.   CONTRAST: 75ml Isovue 370    FINDINGS:   LUNGS AND PLEURA: Left lower lobe atelectasis adjacent to the patient's large hiatal hernia. No acute airspace infiltrate. No edema. No pneumothorax or pleural effusion.    MEDIASTINUM/AXILLAE: Mild cardiomegaly. Incidental note made of left-sided SVC, normal variant. No pericardial effusion. Large hiatal hernia containing the majority of the stomach. Ectatic thoracic aorta. There is some amorphous soft tissue density at   the leftward aspect of the thoracic inlet extending inferiorly into the prevascular space of the anterior mediastinum, measuring up to 3.2 x 2.0 cm on image 29 of series 4. There is a 1.4 cm hypodense left thyroid lobe nodule.    CORONARY ARTERY CALCIFICATION: Mild.    HEPATOBILIARY: Large gallstones in the gallbladder fundus. No focal liver injury.    PANCREAS: Normal.    SPLEEN:  Normal.    ADRENAL GLANDS: Mild nonspecific adrenal thickening.    KIDNEYS/BLADDER: Benign right renal cortical cysts requiring no follow-up. No hydronephrosis. Bladder is normal.    BOWEL: Severe distal colonic diverticulosis. No free air.    LYMPH NODES: Normal.    VASCULATURE: Tortuous, mildly atherosclerotic abdominal aorta without aneurysm.    PELVIC ORGANS: Uterus is atrophic or absent. No free fluid.    MUSCULOSKELETAL: Severe osteopenia. Old right distal clavicle fracture. Marked accentuation of the thoracic kyphosis. Moderate right and severe left hip arthritic change.      Impression    IMPRESSION:  1.  No acute, traumatic findings.    2.  Nonspecific soft tissue density at the thoracic inlet continuing inferiorly into the prevascular space of the anterior mediastinum, concerning for adenopathy or lymphoproliferative etiology such as lymphoma.    3.  1.4 cm left thyroid lobe nodule.    4.  Large hiatal hernia containing the majority of the stomach.    5.  Cholelithiasis.    6.  Severe colonic diverticulosis.   CT Thoracic Spine Reconstructed    Narrative    EXAM: CT THORACIC SPINE RECONSTRUCTED, CT LUMBAR SPINE RECONSTRUCTED, CT CERVICAL SPINE W/O CONTRAST  LOCATION: [facility]  DATE: 8/31/2023    INDICATION: Traumatic injury. Fall.  COMPARISON: None.  TECHNIQUE:  1) Routine CT Cervical Spine without IV contrast. Multiplanar reformats. Dose reduction techniques were used.   2) Dedicated axial, sagittal, and coronal images of the Thoracic Spine were generated utilizing CT chest source data and are separately reviewed. Dose reduction techniques were used.   3) Dedicated axial, sagittal, and coronal images of the Lumbar Spine were generated utilizing CT abdomen pelvis source data and are separately reviewed. Dose reduction techniques were used.     FINDINGS:    CERVICAL SPINE CT:  VERTEBRA: Trace degenerative anterolisthesis of C6 on C7. Vertebral body heights are preserved. No  evidence of an acute displaced fracture. Diffuse bony demineralization.     CANAL/FORAMINA: Multilevel degenerative changes without high-grade spinal canal stenosis. Multilevel bilateral neural foraminal narrowing with at least moderate bilateral stenosis at C3-C4 and C5-C6.    PARASPINAL: No prevertebral hematoma.     THORACIC SPINE CT:  VERTEBRA: Accentuation of the thoracic spine kyphosis. Multilevel bridging anterior osteophytes with ankylosis of the majority of the thoracic spine. Diffuse bony demineralization. No definite acute displaced fracture.     CANAL/FORAMINA: No high-grade spinal canal stenosis. Scattered mild bilateral neural foraminal narrowing.    PARASPINAL: See separately dictated chest CT.    LUMBAR SPINE CT:  VERTEBRA: Slight left apex curvature of the lumbar spine. Grade I retrolisthesis of L2 on L3 and anterolisthesis of L5 on S1. Vertebral body heights are preserved. The bones are diffusely demineralized. No evidence of an acute displaced fracture.     CANAL/FORAMINA: Mild degenerative changes of the lumbar spine, without high-grade spinal canal or neural foraminal narrowing.    PARASPINAL: See separately dictated CT abdomen and pelvis.      Impression    IMPRESSION:  CERVICAL SPINE CT:  1.  No evidence of an acute displaced fracture of the cervical spine.    THORACIC SPINE CT:  1.  No evidence of an acute displaced fracture of the thoracic spine.    LUMBAR SPINE CT:  1.  No evidence of an acute displaced fracture of the lumbar spine.   CT Lumbar Spine Reconstructed    Narrative    EXAM: CT THORACIC SPINE RECONSTRUCTED, CT LUMBAR SPINE RECONSTRUCTED, CT CERVICAL SPINE W/O CONTRAST  LOCATION: [facility]  DATE: 8/31/2023    INDICATION: Traumatic injury. Fall.  COMPARISON: None.  TECHNIQUE:  1) Routine CT Cervical Spine without IV contrast. Multiplanar reformats. Dose reduction techniques were used.   2) Dedicated axial, sagittal, and coronal images of the Thoracic Spine  were generated utilizing CT chest source data and are separately reviewed. Dose reduction techniques were used.   3) Dedicated axial, sagittal, and coronal images of the Lumbar Spine were generated utilizing CT abdomen pelvis source data and are separately reviewed. Dose reduction techniques were used.     FINDINGS:    CERVICAL SPINE CT:  VERTEBRA: Trace degenerative anterolisthesis of C6 on C7. Vertebral body heights are preserved. No evidence of an acute displaced fracture. Diffuse bony demineralization.     CANAL/FORAMINA: Multilevel degenerative changes without high-grade spinal canal stenosis. Multilevel bilateral neural foraminal narrowing with at least moderate bilateral stenosis at C3-C4 and C5-C6.    PARASPINAL: No prevertebral hematoma.     THORACIC SPINE CT:  VERTEBRA: Accentuation of the thoracic spine kyphosis. Multilevel bridging anterior osteophytes with ankylosis of the majority of the thoracic spine. Diffuse bony demineralization. No definite acute displaced fracture.     CANAL/FORAMINA: No high-grade spinal canal stenosis. Scattered mild bilateral neural foraminal narrowing.    PARASPINAL: See separately dictated chest CT.    LUMBAR SPINE CT:  VERTEBRA: Slight left apex curvature of the lumbar spine. Grade I retrolisthesis of L2 on L3 and anterolisthesis of L5 on S1. Vertebral body heights are preserved. The bones are diffusely demineralized. No evidence of an acute displaced fracture.     CANAL/FORAMINA: Mild degenerative changes of the lumbar spine, without high-grade spinal canal or neural foraminal narrowing.    PARASPINAL: See separately dictated CT abdomen and pelvis.      Impression    IMPRESSION:  CERVICAL SPINE CT:  1.  No evidence of an acute displaced fracture of the cervical spine.    THORACIC SPINE CT:  1.  No evidence of an acute displaced fracture of the thoracic spine.    LUMBAR SPINE CT:  1.  No evidence of an acute displaced fracture of the lumbar spine.

## 2023-08-31 NOTE — PROGRESS NOTES
"   08/31/23 1530   Appointment Info   Signing Clinician's Name / Credentials (OT) Suad Villalobos, OTR/L   Living Environment   People in Home alone   Current Living Arrangements house  ([facility])   Home Accessibility no concerns   Living Environment Comments Pt states she has walk-in shower with grab bars and shower chair.   Self-Care   Equipment Currently Used at Home walker, rolling   Activity/Exercise/Self-Care Comment Pt states she is independent with ADLs at baseline, states lower body dressing is sometimes hard.   Instrumental Activities of Daily Living (IADL)   IADL Comments Pt states she is independent with most IADLs, states she used to drive and now a friend brings her groceries.   General Information   Onset of Illness/Injury or Date of Surgery 08/30/23   Referring Physician Michael Biggs MD   Patient/Family Therapy Goal Statement (OT) none stated   Additional Occupational Profile Info/Pertinent History of Current Problem Per chart review, pt \"is a 89 year old female admitted on 8/30/2023. She is admitted for failure to thrive with concern for intermittent A-fib with RVR, rhabdomyolysis and pressure ulcers on the thoracic spine.\"   Existing Precautions/Restrictions fall   Cognitive Status Examination   Cognitive Status Comments Pt with flat affect, slow processing speed, difficulty answering PLOF questions.   Pain Assessment   Patient Currently in Pain No  (states \"ow\" occasionally during session, denies pain)   Range of Motion Comprehensive   General Range of Motion no range of motion deficits identified   Strength Comprehensive (MMT)   Comment, General Manual Muscle Testing (MMT) Assessment Generalized BUE weakness noted functionally   Transfers   Transfers sit-stand transfer;toilet transfer   Sit-Stand Transfer   Sit-Stand [place] (Transfers) moderate assist (50% patient effort)   Assistive Device (Sit-Stand Transfers) walker, front-wheeled   Toilet Transfer   [place] Level (Toilet Transfer) " not tested   Toilet Transfer Comments Based on functional mobility and transfers, anticipate pt will require hands-on Ax1 to ambulate short distance to bathroom for toileting.   Balance   Balance Comments Pt unsteady on feet, required CGA/Min A to maintain balance using FWW.   Activities of Daily Living   BADL Assessment/Intervention lower body dressing;other (see comments)  (Pt currently not safe to maintain home management/meal prep independently d/t weakness and impaired cognition.)   Lower Body Dressing Assessment/Training   Position (Lower Body Dressing) supported sitting   [place] Level (Lower Body Dressing) unable to perform   Clinical Impression   Criteria for Skilled Therapeutic Interventions Met (OT) Yes, treatment indicated   OT Diagnosis Impaired ability to perform ADLs, IADLs, and functional mobility.   Influenced by the following impairments failure to thrive   OT Problem List-Impairments impacting ADL problems related to;activity tolerance impaired;balance;cognition;mobility;strength   Assessment of Occupational Performance 5 or more Performance Deficits   Identified Performance Deficits transfers, toileting, home management, functional mobility, lower body dressing, cognition   Planned Therapy Interventions (OT) ADL retraining;IADL retraining;balance training;cognition;bed mobility training;strengthening;transfer training;home program guidelines;progressive activity/exercise;risk factor education   Clinical Decision Making Complexity (OT) high complexity   Risk & Benefits of therapy have been explained evaluation/treatment results reviewed;care plan/treatment goals reviewed;risks/benefits reviewed;current/potential barriers reviewed;participants voiced agreement with care plan;participants included;patient   Clinical Impression Comments Pt would benefit from skilled OT services to promote safety with ADLs, IADLs, and functional mobility d/t failure to thrive.   OT Total Evaluation Time   OT  Dilip, High Complexity Minutes (22796) 13   OT Goals   Therapy Frequency (OT) Daily   OT Predicted Duration/Target Date for Goal Attainment 09/07/23   OT Goals Lower Body Dressing;Transfers;Toilet Transfer/Toileting;Cognition   OT: Lower Body Dressing Supervision/stand-by assist;using adaptive equipment   OT: Transfer Supervision/stand-by assist;with assistive device   OT: Toilet Transfer/Toileting Supervision/stand-by assist;toilet transfer;cleaning and garment management;using adaptive equipment   OT: Cognitive Patient/caregiver will verbalize understanding of cognitive assessment results/recommendations as needed for safe discharge planning   OT Discharge Planning   OT Plan STS, ambulate to bathroom for toileting, standing G/H with chair behind, SLUMS   OT Discharge Recommendation (DC Rec) Transitional Care Facility   OT Rationale for DC Rec Pt requires significant hands-on assistance for ADLs and functional mobility at this time, high risk of falls and impaired cognition. Pt not safe to return home to live alone, recommend TCU for strengthening, will likely need more supportive living environment.   OT Brief overview of current status Mod A STS, Min A functional mobility, dependent lower body dressing   Total Session Time   Total Session Time (sum of timed and untimed services) 13

## 2023-08-31 NOTE — ED TRIAGE NOTES
Patient brought in by [facility] from home. Patient lives on her own. Family went to check on her after they had not heard from her in 2 days. She was found sitting on the toilet in any uncomfortable position leaning against the wall. EMS reports there was a large amount of diarrhea in the toilet. EMS also reports that patient has not gone to the doctor in years  and because she did not want her drivers license taken away. On arrival patient is awake and talking. Alert to self and place but disoriented to time and situation. She denies pain or SOB.      Triage Assessment       Row Name 08/30/23 2129       Triage Assessment (Adult)    Airway WDL WDL       Respiratory WDL    Respiratory WDL X  Hypoxia, pt reports she has had acough for 6 weeks but no coughing noted       Skin Circulation/Temperature WDL    Skin Circulation/Temperature WDL X       Cardiac WDL    Cardiac WDL X  irregular heartbeat       Cognitive/Neuro/Behavioral WDL    Cognitive/Neuro/Behavioral WDL X;orientation    Level of Consciousness intermittent confusion    Orientation disoriented to;time;situation    Speech clear    Mood/Behavior calm;cooperative       [place] Coma Scale    Best Eye Response 4-->(E4) spontaneous    Best Motor Response 6-->(M6) obeys commands    Best Verbal Response 4-->(V4) confused    [place] Coma Scale Score 14

## 2023-08-31 NOTE — CONSULTS
"CLINICAL NUTRITION SERVICES - ASSESSMENT NOTE     Nutrition Prescription    RECOMMENDATIONS FOR MDs/PROVIDERS TO ORDER:    Malnutrition Status:    Severe in chronic illness    Recommendations already ordered by Registered Dietitian (RD):  Breakfast-Ensure Clear apple  Lunch-Ensure Enlive vanilla  Dinner-magic cup vanilla  Multivitamin with minerals  Thiamine for likely malnutrition  Weigh pt    Future/Additional Recommendations:  Monitor intake, weight, labs  Obtain nutrition hx and NFPE as able     REASON FOR ASSESSMENT  Elham Manning is a/an 89 year old female assessed by the dietitian for Provider Order - Protein calorie malnutrition with cachexia     NUTRITION HISTORY  Attempted interview, pt sleeping, did not wake.  Returned to see pt, awake, with yossi present. Has lunch on tray table, hungry, wanting to eat, waiting for nurse to sit her up. Per yossi pt is more of a grazer vs eating meals, pt does prepare her own food, feels she has been eating less than usual because of weight loss, feels the weight loss has been over the past several months as family has noted pt's pants no longer stay up. Has not tried supplements, yossi agrees should be sent, obtained flavor preferences.    89 year old female admitted for failure to thrive with concern for intermittent A-fib with RVR, rhabdomyolysis and pressure ulcers on the thoracic spine.    CURRENT NUTRITION ORDERS  Diet: Regular  Intake/Tolerance: no record of intake    LABS  Labs reviewed    MEDICATIONS  Medications reviewed, include NaCl at 100 mL/hr    ANTHROPOMETRICS  Height: 160 cm (5' 3\")  Most Recent Weight: 40.8 kg (90 lb)    IBW: 52.3 kg  BMI: Underweight BMI <18.5  Weight History:   Wt Readings from Last 20 Encounters:   08/30/23 40.8 kg (90 lb)   Likely a stated weight    Dosing Weight: 40.8 kg    ASSESSED NUTRITION NEEDS  Estimated Energy Needs: 2490-5677 kcals/day (30 - 35 kcals/kg )  Justification: Repletion and Underweight  Estimated Protein " Needs: 50+ grams protein/day (1.2+ grams of pro/kg)  Justification: Repletion and Wound healing  Estimated Fluid Needs: 4236-8359 mL/day (1 mL/kcal)   Justification: Maintenance    PHYSICAL FINDINGS  Wound buttocks    MALNUTRITION:  % Weight Loss:  No weight hx to assess weight loss  % Intake:  <50% for >/= 1 month (severe malnutrition)  Subcutaneous Fat Loss:  Orbital region moderate depletion   Muscle Loss:  Temporal region moderate depletion, Clavicle bone region moderate depletion, and Dorsal hand region moderate depletion  Fluid Retention:  None noted    Malnutrition Diagnosis: severe in the context of chronic illness    NUTRITION DIAGNOSIS  Underweight related to inadequate po intake as evidenced by BMI < 18.5      INTERVENTIONS  Implementation  Nutrition Education: No education needs assessed at this time   Breakfast-Ensure Clear apple  Lunch-Ensure Enlive vanilla  Dinner-magic cup vanilla  Multivitamin with mineral  Thiamine  Zinc sulfate x 10 days for wound healing  Weigh pt    Goals  Gain weight  Patient to consume % of nutritionally adequate meals three times per day, or the equivalent with supplements/snacks.     Monitoring/Evaluation  Progress toward goals will be monitored and evaluated per protocol.

## 2023-08-31 NOTE — CONSULTS
[facility] Nurse Inpatient Assessment     Consulted for: thoracic spine wound    Summary: Pt has evolving pressure injuries to spine, L upper sacrum/pelvis, bilateral IT/hip.    Patient History (according to provider note(s):      Elham Manning is a 89 year old female who has not been to a doctor in ages for fear of having her 's license revoked was brought to the emergency room secondary to being found sitting on the toilet in an awkward position.  Family had not heard from her in 2 days and they went over to her house and found her sitting on the toilet in an uncomfortable/awkward position.    On arrival here she is confused but oriented to person/self, found to have fecal matter on her legs in between her toes.  It is not able to provide any meaningful history but she is oriented to herself.  She does not know why she came to the hospital.  Per ER provider she was noted to have a pressure ulcer on her thoracic spine felt to be secondary to where she was leaning against a wall/shower when she was found.    Assessment:      Areas visualized during today's visit:  spine, buttocks/IT/hips    Thoracic spine:  Difficult to determine exact etiology of wound. Likely pressure related wound, however was found sitting on toilet.   Area measures approx 5x3cm. No depth at this time  Skin is dark, dry, almost leathery. feels boggy underneath, erythema to edges. No drainage at this time. Will likely evolve over the next several days.         Left upper sacrum/pelvis:  Stage 1 pressure injury   Area measures approx 5x4cm  Non-blanchable erythema with some areas of darker erythema. No drainage, tissue feels intact at this time      Left IT/hip:  Stage 1 pressure injury  Approx 7x5cm  Non-blanchable erythema- in shape of toilet seat.  No drainage, tissue feels intact at this time        Right IT/hip:  Very diffiicult to assess as pt hard to turn.  Stage 1 pressure injury  Approx 3x2cm- unable to  visualize entire wound   Non-blanchable erythema - in shape of toilet seat.  No drainage, tissue feels intact at this time        Treatment Plan:     Spine/sacrum:  Cleanse with saline, pat dry  Cover with mepilex 4x4, change every 3 days  Assess underneath daily    IT/hips:  Cleanse with wipes, pat dry  Apply barrier cream due to patient incontinence . If there are changes, please order calmoseptine    Patient will need air mattress once she is transferred to a unit.    Orders: Written    RECOMMEND PRIMARY TEAM ORDER: None, at this time  Education provided: importance of repositioning and plan of care  Discussed plan of care with: Patient  WOC nurse follow-up plan: Tuesday/Friday  Notify WOC if wound(s) deteriorate.  Nursing to notify the Provider(s) and re-consult the WOC Nurse if new skin concern.    DATA:     Current support surface: Standard  ED cart  Containment of urine/stool: Incontinence Protocol, Brief, and Purewick external catheter   BMI: Body mass index is 15.94 kg/m .   Active diet order: Orders Placed This Encounter      Combination Diet Regular Diet Adult     Output: No intake/output data recorded.     Labs:   Recent Labs   Lab 08/31/23  0746   ALBUMIN 3.1*   HGB 12.1   WBC 9.6     Pressure injury risk assessment:                          FARHAN DUFFY RN CWOCN, CFCN  Pager no longer is use, please contact through [employer] group: WO Nurse [facility]

## 2023-08-31 NOTE — ED PROVIDER NOTES
EMERGENCY DEPARTMENT ENCOUNTER      NAME: Elham Manning  AGE: 89 year old female  YOB: 1928  MRN: [id number]  EVALUATION DATE & TIME: 8/30/2023  9:24 PM    PCP: No primary care provider on file.    ED PROVIDER: Deon Ruth MD        Chief Complaint   Patient presents with    Generalized Weakness    Altered Mental Status         FINAL IMPRESSION:  1. Atrial fibrillation with RVR (H)    2. Traumatic rhabdomyolysis, initial encounter (H)    3. Pressure injury of skin of back, unspecified injury stage    4. Leukocytosis, unspecified type    5. Abnormal CT of the chest    6. Gallstones    7. Hiatal hernia    8. Thyroid nodule          ED COURSE & MEDICAL DECISION MAKING:    Pertinent Labs & Imaging studies reviewed. (See chart for details)  89 year old female presents to the Emergency Department for evaluation of patient being slumped over against bathtub while on toilet.  Has pressure sore to thoracic spine.  Lives independently and sounds like developing dementia but has not gone to see the doctor per nursing because aware about losing her license    Nontoxic-appearing but does have generalized weakness, lower extremities are covered in stool.  Generalized weakness to lower extremities.  While interviewing patient her heart rate jumped in the 190s to 200s range that appear to be regular narrow complex    I reviewed cardiac monitor and appears to be intermittent A-fib with 1 episode of possible nonsustained V. tach    ED Course as of 08/31/23 0236   Wed Aug 30, 2023   2346 89-year-old frail elderly female with likely dementia who lives independently presents via EMS after family found her slumped over but responsive with generalized pain in the bathroom.  Last seen couple days ago.  On exam she is covered in stool.  She is intermittently into A-fib with RVR with heart rates in the 190s.  She may have had a brief run of V. tach versus artifact for 5 beats.  This is asymptomatic.   2346 Clinically has some  ptosis to her left eyelid and generalized weakness particularly to the left and right lower extremity   2347 She has a an abrasion and pressure sore to her thoracic spine   2347 Plan for hepatic function panel, BMP, troponin, magnesium, CBC, UA, blood cultures, CK, lactic acid, UA   2347 She is a very poor historian.  Will obtain CT head and CT imaging of spine based on weakness and question of fall is well as CT chest abdomen pelvis   2347 We will give 5 and cc IV fluids for likely dehydration as well as metoprolol for likely A-fib   2348 Pt full code per patient and, member at bedside   Thu Aug 31, 2023   0011 Magnesium(!): 2.6   0011 Troponin T, High Sensitivity(!): 60  We will obtain delta troponin   0011 WBC(!): 13.1  UA, body imaging, blood cultures pending   0011 Urea Nitrogen(!): 73.0   0011 Creatinine(!): 1.09  Likely secondary to dehydration.  IV fluids ordered   0011 Bilirubin Total(!): 2.3  Unclear significance, CT imaging of abdomen pending   0050 Lab Called critically high CK of 2000   0113 Troponin T, High Sensitivity(!): 55  Not ruling in for ACS   0113 CK Total(!!): 2,572  Consistent with rhabdomyolysis   0113 Given IV fluids   0113 Nursing attempted to straight cath but was unsuccessful.   0113 With heart rates being intermittently the 190s to 200s appears to be A-fib given metoprolol   0114 Lactic Acid: 1.5   0136 WBC(!): 13.1   0217 I attempted to call son  but there was no answer.   0217 Plan for admission for further management rhabdomyolysis, A-fib, further evaluation of this possible mass concerning for lymphoma     2:14 AM I updated the patient.  2:33 AM I Spoke with Dr. Cabral, Hospitalist. We further discussed the patient's case and reviewed ED work-up so far. She agrees to admit the patient.    Medical Decision Making    History:  Supplemental history from: Documented in chart, if applicable  External Record(s) reviewed: Documented in chart, if applicable.    Work Up:  Chart  "documentation includes differential considered and any EKGs or imaging independently interpreted by provider, where specified.  In additional to work up documented, I considered the following work up: Documented in chart, if applicable.    External consultation:  Discussion of management with another provider: Documented in chart, if applicable    Complicating factors:  Care impacted by chronic illness: Hyperlipidemia, Hypertension, and Mental Health  Care affected by social determinants of health: Access to Medical Care    Disposition considerations: Admit.          Voice recognition software was used in the creation of this note. Any grammatical or nonsensical errors are due to inherent errors with the software and are not the intention of the writer.         At the conclusion of the encounter I discussed the results of all of the tests and the disposition. The questions were answered. The patient or family acknowledged understanding and was agreeable with the care plan.         The patient is critically ill and has required 30 minutes of critical care time exclusive of procedures. This includes time spent interviewing the patient, ordering tests and medications, monitoring vital signs, reviewing results, patient updates, discussing the case with family and consultants, and admission.      MEDICATIONS GIVEN IN THE EMERGENCY:  Medications   0.9% sodium chloride BOLUS (0 mLs Intravenous Stopped 8/31/23 0125)   metoprolol (LOPRESSOR) injection 5 mg (5 mg Intravenous $Given 8/31/23 0003)   iopamidol (ISOVUE-370) solution 75 mL (75 mLs Intravenous $Given 8/31/23 0134)       NEW PRESCRIPTIONS STARTED AT TODAY'S ER VISIT  New Prescriptions    No medications on file          =================================================================    HPI    Triage note  \"Patient brought in by [facility] from home. Patient lives on her own. Family went to check on her after they had not heard from her in 2 days. She was found " "sitting on the toilet in any uncomfortable position leaning against the wall. EMS reports there was a large amount of diarrhea in the toilet. EMS also reports that patient has not gone to the doctor in years  and because she did not want her drivers license taken away. On arrival patient is awake and talking. Alert to self and place but disoriented to time and situation. She denies pain or SOB.      Triage Assessment       Row Name 08/30/23 2123       Triage Assessment (Adult)    Airway WDL WDL       Respiratory WDL    Respiratory WDL X  Hypoxia, pt reports she has had acough for 6 weeks but no coughing noted       Skin Circulation/Temperature WDL    Skin Circulation/Temperature WDL X       Cardiac WDL    Cardiac WDL X  irregular heartbeat       Cognitive/Neuro/Behavioral WDL    Cognitive/Neuro/Behavioral WDL X;orientation    Level of Consciousness intermittent confusion    Orientation disoriented to;time;situation    Speech clear    Mood/Behavior calm;cooperative       [place] Coma Scale    Best Eye Response 4-->(E4) spontaneous    Best Motor Response 6-->(M6) obeys commands    Best Verbal Response 4-->(V4) confused    [place] Coma Scale Score 14                    \"      Patient information was obtained from: The patient    Use of : N/A         Elham Manning is a 89 year old female with a pertinent history of hypertension, hyperlipidemia, ischemic left MCA stroke, who presents to this ED via EMS for evaluation of generalized weakness and altered mental status.    he patient's son was concerned because she was not picking up the phone, thus he went to check on her. The patient was found slumped over the toiled and covered in stool.  His son notes the last time she was seen was 2 days ago and was behaving normal at that time. The patient reports she is fine and is having no pain. The patient is found covered in stool. Her son reports she had a prior stroke in 2013. She is behaving at baseline according to " "her son. She does not take any chronic medication. She is tachycardic in the ER with bruising to the thoracic region. No other medical complaints or concerns at this time.      REVIEW OF SYSTEMS   Review of Systems   Musculoskeletal:         Positive for bruising to thoracic spine   All other systems reviewed and are negative.       PAST MEDICAL HISTORY:  History reviewed. No pertinent past medical history.    PAST SURGICAL HISTORY:  History reviewed. No pertinent surgical history.        CURRENT MEDICATIONS:    No current outpatient medications on file.      ALLERGIES:  No Known Allergies    FAMILY HISTORY:  No family history on file.    SOCIAL HISTORY:   Social History     Socioeconomic History    Marital status:        VITALS:  BP (!) 167/74   Pulse 82   Temp 98.4  F (36.9  C) (Oral)   Resp 23   Ht 1.6 m (5' 3\")   Wt 40.8 kg (90 lb)   SpO2 96%   BMI 15.94 kg/m      PHYSICAL EXAM      Vitals: BP (!) 167/74   Pulse 82   Temp 98.4  F (36.9  C) (Oral)   Resp 23   Ht 1.6 m (5' 3\")   Wt 40.8 kg (90 lb)   SpO2 96%   BMI 15.94 kg/m    General: Appears in no acute distress, awake, alert, interactive. Covered in stool.  Frail elderly female  Eyes: Conjunctivae non-injected. Sclera anicteric. Ptosis of left eyelid.  HENT: Atraumatic.  Neck: Supple.  Respiratory/Chest: Respiration unlabored. Intermittent Hrs at 180s, 190s, and 200s, appears in a-fib.   Heart: Regular rhythm, cardiac monitor shows irregular narrow complex rhythm with heart rates in the 190s 200s.  Abdomen: non distended  Musculoskeletal: Pressure ulcer to thoracic spine, bruising to thoracic spine consistent with ulcer. General weakness to bilateral lower extremities, cannot lift without assistance. Strong dorsi pedal pulses.  Skin: Normal color. No rash or diaphoresis.  Neurologic: Face symmetric, moves all extremities spontaneously. Speech clear.  Psychiatric: .Affect appropriate.  Not oriented to year.        LAB:  All pertinent labs " reviewed and interpreted.  Results for orders placed or performed during the hospital encounter of 08/30/23   Head CT w/o contrast    Impression    IMPRESSION:  1.  No CT evidence for acute intracranial process.  2.  Chronic changes as above.   Cervical spine CT w/o contrast    Impression    IMPRESSION:  CERVICAL SPINE CT:  1.  No evidence of an acute displaced fracture of the cervical spine.    THORACIC SPINE CT:  1.  No evidence of an acute displaced fracture of the thoracic spine.    LUMBAR SPINE CT:  1.  No evidence of an acute displaced fracture of the lumbar spine.   CT Chest/Abdomen/Pelvis w Contrast    Impression    IMPRESSION:  1.  No acute, traumatic findings.    2.  Nonspecific soft tissue density at the thoracic inlet continuing inferiorly into the prevascular space of the anterior mediastinum, concerning for adenopathy or lymphoproliferative etiology such as lymphoma.    3.  1.4 cm left thyroid lobe nodule.    4.  Large hiatal hernia containing the majority of the stomach.    5.  Cholelithiasis.    6.  Severe colonic diverticulosis.   CT Thoracic Spine Reconstructed    Impression    IMPRESSION:  CERVICAL SPINE CT:  1.  No evidence of an acute displaced fracture of the cervical spine.    THORACIC SPINE CT:  1.  No evidence of an acute displaced fracture of the thoracic spine.    LUMBAR SPINE CT:  1.  No evidence of an acute displaced fracture of the lumbar spine.   CT Lumbar Spine Reconstructed    Impression    IMPRESSION:  CERVICAL SPINE CT:  1.  No evidence of an acute displaced fracture of the cervical spine.    THORACIC SPINE CT:  1.  No evidence of an acute displaced fracture of the thoracic spine.    LUMBAR SPINE CT:  1.  No evidence of an acute displaced fracture of the lumbar spine.   Extra Blue Top Tube   Result Value Ref Range    Hold Specimen JIC    Extra Red Top Tube   Result Value Ref Range    Hold Specimen JIC    Extra Green Top (Lithium Heparin) Tube   Result Value Ref Range    Hold  Specimen JIC    Extra Purple Top Tube   Result Value Ref Range    Hold Specimen JIC    Troponin T, High Sensitivity (now)   Result Value Ref Range    Troponin T, High Sensitivity 60 (H) <=14 ng/L   Basic metabolic panel   Result Value Ref Range    Sodium 144 136 - 145 mmol/L    Potassium 4.6 3.4 - 5.3 mmol/L    Chloride 105 98 - 107 mmol/L    Carbon Dioxide (CO2) 26 22 - 29 mmol/L    Anion Gap 13 7 - 15 mmol/L    Urea Nitrogen 73.0 (H) 8.0 - 23.0 mg/dL    Creatinine 1.09 (H) 0.51 - 0.95 mg/dL    Calcium 9.4 8.2 - 9.6 mg/dL    Glucose 120 (H) 70 - 99 mg/dL    GFR Estimate 47 (L) >60 mL/min/1.73m2   Hepatic function panel   Result Value Ref Range    Protein Total 6.9 6.4 - 8.3 g/dL    Albumin 4.0 3.5 - 5.2 g/dL    Bilirubin Total 2.3 (H) <=1.2 mg/dL    Alkaline Phosphatase 62 35 - 104 U/L    AST 94 (H) 0 - 45 U/L    ALT 26 0 - 50 U/L    Bilirubin Direct 0.50 (H) 0.00 - 0.30 mg/dL   Result Value Ref Range    Magnesium 2.6 (H) 1.7 - 2.3 mg/dL   CBC with platelets and differential   Result Value Ref Range    WBC Count 13.1 (H) 4.0 - 11.0 10e3/uL    RBC Count 4.46 3.80 - 5.20 10e6/uL    Hemoglobin 13.4 11.7 - 15.7 g/dL    Hematocrit 41.8 35.0 - 47.0 %    MCV 94 78 - 100 fL    MCH 30.0 26.5 - 33.0 pg    MCHC 32.1 31.5 - 36.5 g/dL    RDW 15.0 10.0 - 15.0 %    Platelet Count 227 150 - 450 10e3/uL    % Neutrophils 89 %    % Lymphocytes 3 %    % Monocytes 7 %    % Eosinophils 0 %    % Basophils 0 %    % Immature Granulocytes 1 %    NRBCs per 100 WBC 0 <1 /100    Absolute Neutrophils 11.6 (H) 1.6 - 8.3 10e3/uL    Absolute Lymphocytes 0.4 (L) 0.8 - 5.3 10e3/uL    Absolute Monocytes 1.0 0.0 - 1.3 10e3/uL    Absolute Eosinophils 0.0 0.0 - 0.7 10e3/uL    Absolute Basophils 0.0 0.0 - 0.2 10e3/uL    Absolute Immature Granulocytes 0.1 <=0.4 10e3/uL    Absolute NRBCs 0.0 10e3/uL   Lactic acid whole blood   Result Value Ref Range    Lactic Acid 1.5 0.7 - 2.0 mmol/L   Result Value Ref Range    CK 2,572 () 26 - 192 U/L   Troponin T,  High Sensitivity (now)   Result Value Ref Range    Troponin T, High Sensitivity 55 (H) <=14 ng/L   Adult Type and Screen   Result Value Ref Range    ABO/RH(D) A NEG     Antibody Screen Negative Negative    SPECIMEN EXPIRATION DATE [id number]        RADIOLOGY:  Reviewed all pertinent imaging. Please see official radiology report.  CT Lumbar Spine Reconstructed   Final Result   IMPRESSION:   CERVICAL SPINE CT:   1.  No evidence of an acute displaced fracture of the cervical spine.      THORACIC SPINE CT:   1.  No evidence of an acute displaced fracture of the thoracic spine.      LUMBAR SPINE CT:   1.  No evidence of an acute displaced fracture of the lumbar spine.      CT Thoracic Spine Reconstructed   Final Result   IMPRESSION:   CERVICAL SPINE CT:   1.  No evidence of an acute displaced fracture of the cervical spine.      THORACIC SPINE CT:   1.  No evidence of an acute displaced fracture of the thoracic spine.      LUMBAR SPINE CT:   1.  No evidence of an acute displaced fracture of the lumbar spine.      CT Chest/Abdomen/Pelvis w Contrast   Final Result   IMPRESSION:   1.  No acute, traumatic findings.      2.  Nonspecific soft tissue density at the thoracic inlet continuing inferiorly into the prevascular space of the anterior mediastinum, concerning for adenopathy or lymphoproliferative etiology such as lymphoma.      3.  1.4 cm left thyroid lobe nodule.      4.  Large hiatal hernia containing the majority of the stomach.      5.  Cholelithiasis.      6.  Severe colonic diverticulosis.      Cervical spine CT w/o contrast   Final Result   IMPRESSION:   CERVICAL SPINE CT:   1.  No evidence of an acute displaced fracture of the cervical spine.      THORACIC SPINE CT:   1.  No evidence of an acute displaced fracture of the thoracic spine.      LUMBAR SPINE CT:   1.  No evidence of an acute displaced fracture of the lumbar spine.      Head CT w/o contrast   Final Result   IMPRESSION:   1.  No CT evidence for  acute intracranial process.   2.  Chronic changes as above.          EKG:    Performed at: 949 PM 30 Aug 2023    Impression: Sinus tachycardia with premature supraventricular complexes    Rate: 111  Rhythm: Sinus  Axis: 33  WY Interval: 130  QRS Interval: 74  QTc Interval: 462  ST Changes: Nonspecific ST changes  Comparison: No previous      Performed at: 1146 PM 30 Aug 2023    Impression: Atrial fibrillation with RVR. Voltage criteria for left ventricular hypertrophy. ST & T wave abnormality, consider lateral ischemia.    When compared with ECG of 08/30/2023 at 949 PM, Atrial fibrillation replaced sinus rhythm. ST now depressed in Anterior leads. Nonspecific T wave abnormality no longer evident in Anterior leads. Nonspecific T wave abnormality now evident in Lateral leads    Rate: 144  Rhythm: Atrial fibrillation  Axis: 25  QRS Interval: 62  QTc Interval: 365  ST Changes: Nonspecific ST changes  Comparison: 08/30/2023 at 949PM  I have independently reviewed and interpreted the EKG(s) documented above.        I, Sherman Ramirez, am serving as a scribe to document services personally performed by Diony Ruth MD based on my observation and the provider's statements to me. I, Dr. Diony Ruth, attest that Sherman Ramirez is acting in a scribe capacity, has observed my performance of the services and has documented them in accordance with my direction.    Diony Ruth MD  Emergency Medicine  [facility] EMERGENCY DEPARTMENT  [employer] [facility] [facility] [id number]  [phone]       Diony Ruth MD  08/31/23 0236

## 2023-08-31 NOTE — MEDICATION SCRIBE - ADMISSION MEDICATION HISTORY
Medication Scribe Admission Medication History    Admission medication history is complete. The information provided in this note is only as accurate as the sources available at the time of the update.    Medication reconciliation/reorder completed by provider prior to medication history? No    Information Source(s): Patient via in-person    Pertinent Information: Patient expressed she doesn't take any meds currently.  Nurse reported the same was told to her.  The meds on her outside history are a year old so he weren't added.  Patient is sowing memory loss per nurse.    Changes made to PTA medication list:  Added: None  Deleted: None  Changed: None    Medication Affordability:  Not including over the counter (OTC) medications, was there a time in the past 3 months when you did not take your medications as prescribed because of cost?: No    Allergies reviewed with patient and updates made in EHR: yes    Medication History Completed By: Kiran Lemon 8/31/2023 5:58 AM    Prior to Admission medications    Not on File

## 2023-08-31 NOTE — CONSULTS
Care Management Initial Consult    General Information  Assessment completed with: Children, Step-son Alex  Type of CM/SW Visit: Initial Assessment    Primary Care Provider verified and updated as needed: No   Readmission within the last 30 days:        Reason for Consult: discharge planning  Advance Care Planning:       General Information Comments: Patient has no PCP. Should have insurance.    Communication Assessment  Patient's communication style: spoken language (English or Bilingual)      Cognitive  Cognitive/Neuro/Behavioral: .WDL except, orientation  Level of Consciousness: confused  Arousal Level: opens eyes spontaneously  Orientation: disoriented to, time, situation  Mood/Behavior: calm, cooperative  Best Language: 0 - No aphasia  Speech: whispers, slow    Living Environment:   People in home: alone     Current living Arrangements: condominium (Single-level [facility].)      Able to return to prior arrangements: no  Living Arrangement Comments: May need TCU. Step-children feel she should be in long term care    Family/Social Support:  Care provided by: self, child(varun)  Provides care for: no one  Marital Status:   Other (specify) (Step-sons and daughter-in-law)          Description of Support System: Supportive      Current Resources:   Patient receiving home care services: No     Community Resources: None  Equipment currently used at home:    Supplies currently used at home:  (Uses a walker inside the [facility])    Employment/Financial:  Employment Status: retired        Financial Concerns: insurance, none   Referral to Financial Worker: Yes  Finance Comments: May have insurance but does not have cards currently    Does the patient's insurance plan have a 3 day qualifying hospital stay waiver?  No    Lifestyle & Psychosocial Needs:  Social Determinants of Health     Tobacco Use: Not on file   Alcohol Use: Not on file   Financial Resource Strain: Not on file   Food Insecurity: Not on file  "  Transportation Needs: Not on file   Physical Activity: Not on file   Stress: Not on file   Social Connections: Not on file   Intimate Partner Violence: Not on file   Depression: Not on file   Housing Stability: Not on file     Functional Status:  Prior to admission patient needed assistance:   Dependent ADLs:: Ambulation-walker  Dependent IADLs:: Cleaning, Cooking, Laundry, Shopping, Transportation     Mental Health Status:  Mental Health Status: No Current Concerns       Chemical Dependency Status:  Chemical Dependency Status: No Current Concerns           Values/Beliefs:  Spiritual, Cultural Beliefs, Religion Practices, Values that affect care: no          Values/Beliefs Comment: Christianity    Additional Information:  Patient was found at home on the toilet after family had not heard from her in a couple of days. She was brought to the hospital where she was noted to be covered in dried feces. She is confused. She has been undoctored by choice and was found to have intermittent A-fib with RVR, rhabdomyolysis and pressure ulcers on the thoracic spine. She has no insurance on file (referral made to [facility] SW to see if she could access any information on a Medicare website).  Met with patient's step-son Alex at the bedside. Patient lives alone in a single-level living town home and uses a walker in the home for mobility. Per Alex patient had been largely independent with activities of daily living and most instrumental activities of daily living (IADLs) until very recently. He notes her appetite has been poor and she has been losing weight. His brothers feel she should be \"placed in a home (long term care)\"  but one lives out of state and the other is currently on a cruise. Alex states that patient does not want to go to a nursing home. They (the step-sons) have not talked about funding for long term care. Alex will be primary contact for discharge planning.   In the event Transitional care (TCU) is recommended " for continued therapy and skilled nursing, writer provided a list of local skilled nursing facilities (which includes the medicare.gov website) for patient and family to review.  2:23 PM:  Reviewed with MD: patient may be medically ready for discharge in the next day or two. He has added PT and OT consults for discharge planning. Patient has no insurance listed but the financial counselor is checking to see if information may be available.   2:47 PM: Financial counselor was able to locate and add patient's [employer] insurance. Will update Alex.    CM will continue to monitor progression of care, review team recommendations and provide discharge planning assist as needed.      Merari Poole RN

## 2023-09-01 ENCOUNTER — APPOINTMENT (OUTPATIENT)
Dept: OCCUPATIONAL THERAPY | Facility: HOSPITAL | Age: 88
DRG: 070 | End: 2023-09-01
Payer: COMMERCIAL

## 2023-09-01 LAB
ALBUMIN SERPL BCG-MCNC: 2.4 G/DL (ref 3.5–5.2)
ALP SERPL-CCNC: 36 U/L (ref 35–104)
ALT SERPL W P-5'-P-CCNC: 24 U/L (ref 0–50)
ANION GAP SERPL CALCULATED.3IONS-SCNC: 6 MMOL/L (ref 7–15)
AST SERPL W P-5'-P-CCNC: 64 U/L (ref 0–45)
ATRIAL RATE - MUSE: 100 BPM
ATRIAL RATE - MUSE: 111 BPM
ATRIAL RATE - MUSE: 118 BPM
BACTERIA UR CULT: ABNORMAL
BILIRUB DIRECT SERPL-MCNC: 0.31 MG/DL (ref 0–0.3)
BILIRUB SERPL-MCNC: 1.2 MG/DL
BUN SERPL-MCNC: 56.3 MG/DL (ref 8–23)
CALCIUM SERPL-MCNC: 7.7 MG/DL (ref 8.2–9.6)
CHLORIDE SERPL-SCNC: 113 MMOL/L (ref 98–107)
CK SERPL-CCNC: 1211 U/L (ref 26–192)
CREAT SERPL-MCNC: 0.79 MG/DL (ref 0.51–0.95)
DEPRECATED HCO3 PLAS-SCNC: 25 MMOL/L (ref 22–29)
DIASTOLIC BLOOD PRESSURE - MUSE: 57 MMHG
DIASTOLIC BLOOD PRESSURE - MUSE: 65 MMHG
DIASTOLIC BLOOD PRESSURE - MUSE: 98 MMHG
ERYTHROCYTE [DISTWIDTH] IN BLOOD BY AUTOMATED COUNT: 14.9 % (ref 10–15)
GFR SERPL CREATININE-BSD FRML MDRD: 69 ML/MIN/1.73M2
GLUCOSE BLDC GLUCOMTR-MCNC: 150 MG/DL (ref 70–99)
GLUCOSE SERPL-MCNC: 92 MG/DL (ref 70–99)
HCT VFR BLD AUTO: 29.9 % (ref 35–47)
HGB BLD-MCNC: 9.5 G/DL (ref 11.7–15.7)
INTERPRETATION ECG - MUSE: NORMAL
MAGNESIUM SERPL-MCNC: 2.1 MG/DL (ref 1.7–2.3)
MCH RBC QN AUTO: 30.4 PG (ref 26.5–33)
MCHC RBC AUTO-ENTMCNC: 31.8 G/DL (ref 31.5–36.5)
MCV RBC AUTO: 96 FL (ref 78–100)
P AXIS - MUSE: -20 DEGREES
P AXIS - MUSE: -4 DEGREES
P AXIS - MUSE: NORMAL DEGREES
PHOSPHATE SERPL-MCNC: 1.9 MG/DL (ref 2.5–4.5)
PLATELET # BLD AUTO: 147 10E3/UL (ref 150–450)
POTASSIUM SERPL-SCNC: 4 MMOL/L (ref 3.4–5.3)
PR INTERVAL - MUSE: 130 MS
PR INTERVAL - MUSE: 140 MS
PR INTERVAL - MUSE: NORMAL MS
PROT SERPL-MCNC: 4.5 G/DL (ref 6.4–8.3)
QRS DURATION - MUSE: 62 MS
QRS DURATION - MUSE: 72 MS
QRS DURATION - MUSE: 74 MS
QT - MUSE: 236 MS
QT - MUSE: 340 MS
QT - MUSE: 358 MS
QTC - MUSE: 365 MS
QTC - MUSE: 461 MS
QTC - MUSE: 462 MS
R AXIS - MUSE: -34 DEGREES
R AXIS - MUSE: 25 DEGREES
R AXIS - MUSE: 33 DEGREES
RBC # BLD AUTO: 3.13 10E6/UL (ref 3.8–5.2)
SODIUM SERPL-SCNC: 144 MMOL/L (ref 136–145)
SYSTOLIC BLOOD PRESSURE - MUSE: 103 MMHG
SYSTOLIC BLOOD PRESSURE - MUSE: 132 MMHG
SYSTOLIC BLOOD PRESSURE - MUSE: 156 MMHG
T AXIS - MUSE: 175 DEGREES
T AXIS - MUSE: 22 DEGREES
T AXIS - MUSE: 41 DEGREES
VENTRICULAR RATE- MUSE: 100 BPM
VENTRICULAR RATE- MUSE: 111 BPM
VENTRICULAR RATE- MUSE: 144 BPM
WBC # BLD AUTO: 6.2 10E3/UL (ref 4–11)

## 2023-09-01 PROCEDURE — 250N000013 HC RX MED GY IP 250 OP 250 PS 637: Performed by: INTERNAL MEDICINE

## 2023-09-01 PROCEDURE — 97535 SELF CARE MNGMENT TRAINING: CPT | Mod: GO

## 2023-09-01 PROCEDURE — 258N000003 HC RX IP 258 OP 636: Performed by: HOSPITALIST

## 2023-09-01 PROCEDURE — 80053 COMPREHEN METABOLIC PANEL: CPT | Performed by: HOSPITALIST

## 2023-09-01 PROCEDURE — 250N000013 HC RX MED GY IP 250 OP 250 PS 637: Performed by: HOSPITALIST

## 2023-09-01 PROCEDURE — 36415 COLL VENOUS BLD VENIPUNCTURE: CPT | Performed by: HOSPITALIST

## 2023-09-01 PROCEDURE — 99233 SBSQ HOSP IP/OBS HIGH 50: CPT | Performed by: HOSPITALIST

## 2023-09-01 PROCEDURE — 85027 COMPLETE CBC AUTOMATED: CPT | Performed by: HOSPITALIST

## 2023-09-01 PROCEDURE — 83735 ASSAY OF MAGNESIUM: CPT | Performed by: HOSPITALIST

## 2023-09-01 PROCEDURE — 120N000001 HC R&B MED SURG/OB

## 2023-09-01 PROCEDURE — 84100 ASSAY OF PHOSPHORUS: CPT | Performed by: HOSPITALIST

## 2023-09-01 PROCEDURE — 82248 BILIRUBIN DIRECT: CPT | Performed by: HOSPITALIST

## 2023-09-01 PROCEDURE — 82550 ASSAY OF CK (CPK): CPT | Performed by: HOSPITALIST

## 2023-09-01 RX ORDER — SODIUM CHLORIDE 9 MG/ML
INJECTION, SOLUTION INTRAVENOUS CONTINUOUS
Status: ACTIVE | OUTPATIENT
Start: 2023-09-01 | End: 2023-09-01

## 2023-09-01 RX ORDER — SODIUM CHLORIDE 9 MG/ML
INJECTION, SOLUTION INTRAVENOUS CONTINUOUS
Status: DISCONTINUED | OUTPATIENT
Start: 2023-09-01 | End: 2023-09-01

## 2023-09-01 RX ADMIN — METOPROLOL SUCCINATE 12.5 MG: 25 TABLET, EXTENDED RELEASE ORAL at 08:21

## 2023-09-01 RX ADMIN — ZINC SULFATE 220 MG (50 MG) CAPSULE 220 MG: CAPSULE at 08:15

## 2023-09-01 RX ADMIN — SODIUM CHLORIDE 100 ML/HR: 9 INJECTION, SOLUTION INTRAVENOUS at 12:34

## 2023-09-01 RX ADMIN — APIXABAN 2.5 MG: 2.5 TABLET, FILM COATED ORAL at 08:15

## 2023-09-01 RX ADMIN — SODIUM CHLORIDE: 9 INJECTION, SOLUTION INTRAVENOUS at 08:17

## 2023-09-01 RX ADMIN — Medication 1 TABLET: at 08:16

## 2023-09-01 RX ADMIN — Medication 50 MG: at 08:15

## 2023-09-01 RX ADMIN — SODIUM CHLORIDE: 9 INJECTION, SOLUTION INTRAVENOUS at 02:25

## 2023-09-01 RX ADMIN — APIXABAN 2.5 MG: 2.5 TABLET, FILM COATED ORAL at 22:20

## 2023-09-01 ASSESSMENT — ACTIVITIES OF DAILY LIVING (ADL)
ADLS_ACUITY_SCORE: 49
ADLS_ACUITY_SCORE: 48
ADLS_ACUITY_SCORE: 49

## 2023-09-01 NOTE — PROGRESS NOTES
Pt noted to have slight aphasia, MD notified and came to bedside and did neuro assessment. Swat also came to bedside to assess. No other neurological deficits noted. Blood sugar checked and was 150. No change to plan.

## 2023-09-01 NOTE — PROGRESS NOTES
Care Management Follow Up    Length of Stay (days): 1    Expected Discharge Date: 09/02/2023     Concerns to be Addressed:     discharge disposition   Patient plan of care discussed at interdisciplinary rounds: Yes    Anticipated Discharge Disposition:  TCU      Anticipated Discharge Services:  skilled therapy and nursing   Anticipated Discharge DME:  per treatment team     Patient/family educated on Medicare website which has current facility and service quality ratings:  NA  Education Provided on the Discharge Plan:  yes  Patient/Family in Agreement with the Plan:  yes    Referrals Placed by CM/SW:  post acute care facilities   Private pay costs discussed: Not applicable    Additional Information:    9:29 AM  MEI discussed updates with MD.  Pt is medically appropriate for discharge to TCU when safe disposition is found.  MEI called and spoke with Pt's step son, Alex.  Discussed TCU choices from list of facilities in network with [facility].  Alex is in agreement with [facility] [place] [place], [facility], [facility] [place], and [facility].  MEI sent referrals.    Alex reports plan to visit the hospital around 1500 after work and requests full list of [facility] facilities at that time.     10:58 AM  [facility] requesting RN notes.  MEI faxed notes for review.       3:28 PM  MEI spoke with Anjana in admissions at [facility]--facility can accept Pt for TCU on 9/2.  MEI discussed with Alex who states agreement with plan.  Anjana submitting [facility] auth.  Alex requests [facility] transport (MEI discussed potential private pay charges which Alex states understanding of).      [facility] wheel chair transport arranged between [id number].  PAS completed and on chart.  Discharge plan discussed with TCU admissions, Pt's son Alex, and MD.     COLEEN Rincon

## 2023-09-01 NOTE — PLAN OF CARE
Problem: Plan of Care - These are the overarching goals to be used throughout the patient stay.    Goal: Optimal Comfort and Wellbeing  Outcome: Progressing     Problem: Risk for Delirium  Goal: Improved Attention and Thought Clarity  Outcome: Progressing     Problem: Malnutrition  Goal: Improved Nutritional Intake  Outcome: Progressing     Patient is alert to self with intermittent confusion. Denies pain. NS infusing at 100 ml/hr. Slept well overnight. VSS

## 2023-09-01 NOTE — PROGRESS NOTES
[facility]    Medicine Progress Note - Hospitalist Service    Date of Admission:  8/30/2023    Assessment & Plan   89 year old female admitted on 8/30/2023. She is admitted for failure to thrive with concern for intermittent A-fib with RVR, rhabdomyolysis and pressure ulcers on the thoracic spine after being found sitting on toilet for unknown duration.  Patient has not been to the doctor in years. TTE showed intact EF and some mild AR, MR, TR, and moderate pulmonary hypertension     Admitted with atrial fibrillation with RVR which improved with IV fluid hydration and metoprolol.    Labs notable for mildly elevated rhabdomyolysis and was treated with IV fluids.  CT of the spine negative for any acute findings.  Also had CT C/A/P which noted soft tissue density at thoracic inlet concerning for adenopathy versus lymphoproliferative etiology versus lymphoma.  After discussion with the patient and family opted to watch and not pursue any kind of aggressive work-up given patient's age.    Was seen by PT/OT who recommend TCU.    -Medically ready, pending dispo to TCU.    Acute Encephalopathy (Resolved)  ?Possible Underlying Mild Cognitive Impairment vs Sundowning  -Mental status appears stable  -Neurologically intact  -Would benefit from outpatient cognitive eval such as MOCA  -PT/OT recommends TCU, medically ready    A-fib with RVR (Resolved)  Paroxysmal Atrial Fibrillation  Elevated troponin, Non-MI  -Continue p.o. metoprolol XL 12.5 mg Daily  -Eliquis 2.5 mg BID (age, weight)    Mild Rhabdomyolysis  KATELIN (Resolved)  CK 2500 an Cr 1.1 on admission.  -Continue gentle IV fluids  -Trend serum creatinine and CK    Pressure ulcer thoracic spine  Stage 1 Pressure Injury Sacrum/Pelvis, Left IT/HIP, Right IT/HIP  Present on admission  -Wound care consult, appreciate recs  -Instructions per [facility] nurse    Thoracic Inlet Adenopathy  CT chest with soft tissue density at thoracic inlet concerning for  "adenopathy versus lymphoproliferative etiology versus lymphoma  -Spoke to patient and family, no acute intervention/workup given patient's age    Underweight, BMI < 18.5  Severe Malnutrition in setting of chronic illness  -Nutrition consult, appreciate recs  -Diet: Combination Diet Regular Diet Adult  Snacks/Supplements Adult: Ensure Clear; With Meals  Snacks/Supplements Adult: Ensure Enlive; With Meals  Snacks/Supplements Adult: Magic Cup; With Meals    Hyperbilirubinemia  Cholelithiasis noted on CT  Liver US negative for acute cholecystitis, notes cholelithiasis.    Left thyroid nodule that on CT  Thyroid US notes multiple benign thyroid nodules with no specific follow-up needed.       Diet: Combination Diet Regular Diet Adult  Snacks/Supplements Adult: Ensure Clear; With Meals  Snacks/Supplements Adult: Ensure Enlive; With Meals  Snacks/Supplements Adult: Magic Cup; With Meals    DVT Prophylaxis: DOAC  Cummings Catheter: Not present  Lines: None     Cardiac Monitoring: ACTIVE order. Indication: Tachyarrhythmias, acute (48 hours)  Code Status: Full Code      Clinically Significant Risk Factors           # Hypercalcemia: corrected calcium is >10.1, will monitor as appropriate    # Hypoalbuminemia: Lowest albumin = 2.4 g/dL at 9/1/2023  5:55 AM, will monitor as appropriate            # Cachexia: Estimated body mass index is 15.94 kg/m  as calculated from the following:    Height as of this encounter: 1.6 m (5' 3\").    Weight as of this encounter: 40.8 kg (90 lb)., PRESENT ON ADMISSION            Disposition Plan      Expected Discharge Date: 09/01/2023    Discharge Delays: Placement - TCU              Michael Biggs MD  Hospitalist Service  [facility]  Securely message with [employer] (more info)  Text page via [facility] Paging/Directory   ______________________________________________________________________    Interval History   No acute events overnight.     Patient seen and evaluated at bedside. " Doing okay this morning, denies any chest pain, palpitations, or shortness of breath.  Appetite is good.    Explained the plan for TCU which she is on board with.    Physical Exam   Vital Signs: Temp: 98  F (36.7  C) Temp src: Oral BP: 137/65 Pulse: 74   Resp: 20 SpO2: 94 % O2 Device: None (Room air)    Weight: 90 lbs 0 oz    General: Thin elderly female  CV: +S1/S2, no pitting edema  Respiratory: clear to auscultation bilaterally, no wheezing/crackles  GI: soft, nontender, nondistended  Neuro: alert and oriented to person place    Medical Decision Making       50 MINUTES SPENT BY ME on the date of service doing chart review, history, exam, documentation & further activities per the note.      Data     I have personally reviewed the following data over the past 24 hrs:    6.2  \   9.5 (L)   / 147 (L)     144 113 (H) 56.3 (H) /  150 (H)   4.0 25 0.79 \     ALT: 24 AST: 64 (H) AP: 36 TBILI: 1.2   ALB: 2.4 (L) TOT PROTEIN: 4.5 (L) LIPASE: N/A     Imaging results reviewed over the past 24 hrs:   No results found for this or any previous visit (from the past 24 hour(s)).

## 2023-09-01 NOTE — PLAN OF CARE
"  Problem: Plan of Care - These are the overarching goals to be used throughout the patient stay.    Goal: Plan of Care Review  Description: The Plan of Care Review/Shift note should be completed every shift.  The Outcome Evaluation is a brief statement about your assessment that the patient is improving, declining, or no change.  This information will be displayed automatically on your shift note.  Outcome: Progressing  Flowsheets (Taken 8/31/2023 2012)  Plan of Care Reviewed With: patient  Goal: Patient-Specific Goal (Individualized)  Description: You can add care plan individualizations to a care plan. Examples of Individualization might be:  \"Parent requests to be called daily at 9am for status\", \"I have a hard time hearing out of my right ear\", or \"Do not touch me to wake me up as it startles me\".  Outcome: Progressing  Goal: Absence of Hospital-Acquired Illness or Injury  Outcome: Progressing  Intervention: Identify and Manage Fall Risk  Recent Flowsheet Documentation  Taken 8/31/2023 1900 by Jemal Irving, RN  Safety Promotion/Fall Prevention:   activity supervised   clutter free environment maintained  Intervention: Prevent Skin Injury  Recent Flowsheet Documentation  Taken 8/31/2023 1900 by Jemal Irving, RN  Body Position: position maintained  Goal: Optimal Comfort and Wellbeing  Outcome: Progressing  Goal: Readiness for Transition of Care  Outcome: Progressing     Problem: Risk for Delirium  Goal: Optimal Coping  Outcome: Progressing  Goal: Improved Behavioral Control  Outcome: Progressing  Goal: Improved Attention and Thought Clarity  Outcome: Progressing  Goal: Improved Sleep  Outcome: Progressing     Problem: Malnutrition  Goal: Improved Nutritional Intake  Outcome: Progressing   Goal Outcome Evaluation:      Plan of Care Reviewed With: patient                 "

## 2023-09-01 NOTE — PLAN OF CARE
Goal Outcome Evaluation:       Pt oriented to place and self, intermittently off to situation and time. Appeared to have slight aphasia this afternoon, see previous note. No other neurological changes noted. Pleasant and cooperative. Up in the chair for a portion of the shift and repositioned Q2. She has several pressure ulcers, WOC following. Fair appetite. Denies pain. Fluids running at 100 ml/hr

## 2023-09-02 ENCOUNTER — APPOINTMENT (OUTPATIENT)
Dept: PHYSICAL THERAPY | Facility: HOSPITAL | Age: 88
DRG: 070 | End: 2023-09-02
Payer: COMMERCIAL

## 2023-09-02 VITALS
TEMPERATURE: 98.7 F | HEART RATE: 71 BPM | WEIGHT: 90 LBS | HEIGHT: 63 IN | SYSTOLIC BLOOD PRESSURE: 143 MMHG | RESPIRATION RATE: 16 BRPM | BODY MASS INDEX: 15.95 KG/M2 | DIASTOLIC BLOOD PRESSURE: 65 MMHG | OXYGEN SATURATION: 93 %

## 2023-09-02 LAB
ANION GAP SERPL CALCULATED.3IONS-SCNC: 5 MMOL/L (ref 7–15)
BUN SERPL-MCNC: 39.2 MG/DL (ref 8–23)
CALCIUM SERPL-MCNC: 7.8 MG/DL (ref 8.2–9.6)
CHLORIDE SERPL-SCNC: 116 MMOL/L (ref 98–107)
CK SERPL-CCNC: 746 U/L (ref 26–192)
CREAT SERPL-MCNC: 0.64 MG/DL (ref 0.51–0.95)
DEPRECATED HCO3 PLAS-SCNC: 24 MMOL/L (ref 22–29)
ERYTHROCYTE [DISTWIDTH] IN BLOOD BY AUTOMATED COUNT: 14.9 % (ref 10–15)
GFR SERPL CREATININE-BSD FRML MDRD: 81 ML/MIN/1.73M2
GLUCOSE SERPL-MCNC: 94 MG/DL (ref 70–99)
HCT VFR BLD AUTO: 28.2 % (ref 35–47)
HGB BLD-MCNC: 8.7 G/DL (ref 11.7–15.7)
MAGNESIUM SERPL-MCNC: 2 MG/DL (ref 1.7–2.3)
MCH RBC QN AUTO: 29.5 PG (ref 26.5–33)
MCHC RBC AUTO-ENTMCNC: 30.9 G/DL (ref 31.5–36.5)
MCV RBC AUTO: 96 FL (ref 78–100)
PHOSPHATE SERPL-MCNC: 1.6 MG/DL (ref 2.5–4.5)
PLATELET # BLD AUTO: 148 10E3/UL (ref 150–450)
POTASSIUM SERPL-SCNC: 3.8 MMOL/L (ref 3.4–5.3)
RBC # BLD AUTO: 2.95 10E6/UL (ref 3.8–5.2)
SODIUM SERPL-SCNC: 145 MMOL/L (ref 136–145)
WBC # BLD AUTO: 6 10E3/UL (ref 4–11)

## 2023-09-02 PROCEDURE — 80048 BASIC METABOLIC PNL TOTAL CA: CPT | Performed by: HOSPITALIST

## 2023-09-02 PROCEDURE — 97530 THERAPEUTIC ACTIVITIES: CPT | Mod: GP | Performed by: PHYSICAL THERAPIST

## 2023-09-02 PROCEDURE — 99239 HOSP IP/OBS DSCHRG MGMT >30: CPT | Performed by: HOSPITALIST

## 2023-09-02 PROCEDURE — 84100 ASSAY OF PHOSPHORUS: CPT | Performed by: HOSPITALIST

## 2023-09-02 PROCEDURE — 250N000013 HC RX MED GY IP 250 OP 250 PS 637: Performed by: INTERNAL MEDICINE

## 2023-09-02 PROCEDURE — 36415 COLL VENOUS BLD VENIPUNCTURE: CPT | Performed by: HOSPITALIST

## 2023-09-02 PROCEDURE — 85027 COMPLETE CBC AUTOMATED: CPT | Performed by: HOSPITALIST

## 2023-09-02 PROCEDURE — 250N000013 HC RX MED GY IP 250 OP 250 PS 637: Performed by: HOSPITALIST

## 2023-09-02 PROCEDURE — 82550 ASSAY OF CK (CPK): CPT | Performed by: HOSPITALIST

## 2023-09-02 PROCEDURE — 83735 ASSAY OF MAGNESIUM: CPT | Performed by: HOSPITALIST

## 2023-09-02 RX ORDER — ZINC SULFATE 50(220)MG
220 CAPSULE ORAL DAILY
DISCHARGE
Start: 2023-09-03 | End: 2023-09-13

## 2023-09-02 RX ORDER — MULTIPLE VITAMINS W/ MINERALS TAB 9MG-400MCG
1 TAB ORAL DAILY
DISCHARGE
Start: 2023-09-03

## 2023-09-02 RX ORDER — METOPROLOL SUCCINATE 25 MG/1
12.5 TABLET, EXTENDED RELEASE ORAL DAILY
DISCHARGE
Start: 2023-09-03

## 2023-09-02 RX ADMIN — ZINC SULFATE 220 MG (50 MG) CAPSULE 220 MG: CAPSULE at 08:26

## 2023-09-02 RX ADMIN — APIXABAN 2.5 MG: 2.5 TABLET, FILM COATED ORAL at 08:26

## 2023-09-02 RX ADMIN — METOPROLOL SUCCINATE 12.5 MG: 25 TABLET, EXTENDED RELEASE ORAL at 08:27

## 2023-09-02 RX ADMIN — Medication 1 TABLET: at 08:26

## 2023-09-02 RX ADMIN — Medication 50 MG: at 08:27

## 2023-09-02 ASSESSMENT — ACTIVITIES OF DAILY LIVING (ADL)
ADLS_ACUITY_SCORE: 48

## 2023-09-02 NOTE — DISCHARGE SUMMARY
"[facility]  Hospitalist Discharge Summary      Date of Admission:  8/30/2023  Date of Discharge:  9/2/2023  Discharging Provider: Michael Biggs MD  Discharge Service: Hospitalist Service    Discharge Diagnoses   Acute Encephalopathy (Resolved)  ?Possible Underlying Mild Cognitive Impairment vs Sundowning    Clinically Significant Risk Factors     # Cachexia: Estimated body mass index is 15.94 kg/m  as calculated from the following:    Height as of this encounter: 1.6 m (5' 3\").    Weight as of this encounter: 40.8 kg (90 lb).       Follow-ups Needed After Discharge   Follow-up Appointments     Follow Up and recommended labs and tests      Follow up with shelter physician.  The following labs/tests are   recommended: Hemogram, BMP, Magnesium in 3-5 days of discharge.            Unresulted Labs Ordered in the Past 30 Days of this Admission       Date and Time Order Name Status Description    8/30/2023 11:42 PM Blood Culture Line, venous Preliminary     8/30/2023 11:42 PM Blood Culture Line, venous Preliminary         These results will be followed up by facility provider    Discharge Disposition   Discharged to rehabilitation facility  Condition at discharge: Stable    Hospital Course   89 year old female admitted on 8/30/2023. She is admitted for failure to thrive with concern for intermittent A-fib with RVR, rhabdomyolysis and pressure ulcers on the thoracic spine after being found sitting on toilet for unknown duration.  Patient has not been to the doctor in years. TTE showed intact EF and some mild AR, MR, TR, and moderate pulmonary hypertension     Admitted with atrial fibrillation with RVR which improved with IV fluid hydration and metoprolol.    Labs notable for mildly elevated rhabdomyolysis and was treated with IV fluids.  CT of the spine negative for any acute findings.  Also had CT C/A/P which noted soft tissue density at thoracic inlet concerning for adenopathy versus " lymphoproliferative etiology versus lymphoma.  After discussion with the patient and family opted to watch and not pursue any kind of aggressive work-up given patient's age.    Was seen by PT/OT who recommend TCU. Discharged to TCU on 9/2.    Acute Encephalopathy (Resolved)  ?Possible Underlying Mild Cognitive Impairment vs Sundowning  -Mental status appears stable, ? Whether encephalopathy present on admission, possibly due to dehydration  -Would benefit from outpatient cognitive eval such as MOCA  -PT/OT recommends TCU    A-fib with RVR (Resolved)  Paroxysmal Atrial Fibrillation  Elevated troponin, Non-MI  -Metoprolol XL 12.5 mg Daily  -Eliquis 2.5 mg BID (age, weight)    Mild Rhabdomyolysis  KATELIN (Resolved)  CK 2500 an Cr 1.1 on admission.  -Recheck BMP as outpatient    Pressure ulcer thoracic spine  Stage 1 Pressure Injury Sacrum/Pelvis, Left IT/HIP, Right IT/HIP  Present on admission  -Instructions per [facility] nurse    Thoracic Inlet Adenopathy  CT chest with soft tissue density at thoracic inlet concerning for adenopathy versus lymphoproliferative etiology versus lymphoma  -Spoke to patient and family, no acute intervention/workup given patient's age    Underweight, BMI < 18.5  Severe Malnutrition in setting of chronic illness  -Recommend supplements    Hyperbilirubinemia  Cholelithiasis noted on CT  Liver US negative for acute cholecystitis, notes cholelithiasis.    Left thyroid nodule that on CT  Thyroid US notes multiple benign thyroid nodules with no specific follow-up needed.    Consultations This Hospital Stay   WOUND OSTOMY CONTINENCE NURSE  IP CONSULT  NUTRITION SERVICES ADULT IP CONSULT  PHYSICAL THERAPY ADULT IP CONSULT  CARE MANAGEMENT / SOCIAL WORK IP CONSULT  OCCUPATIONAL THERAPY ADULT IP CONSULT  PHYSICAL THERAPY ADULT IP CONSULT  OCCUPATIONAL THERAPY ADULT IP CONSULT    Code Status   Full Code    Time Spent on this Encounter   IMichael MD, personally saw the patient today and spent greater  than 30 minutes discharging this patient.       Michael Biggs MD  [address] [address]  Phone: [phone]  Fax: [phone]  ______________________________________________________________________    Physical Exam   Vital Signs: Temp: 98.7  F (37.1  C) Temp src: Oral BP: (!) 143/65 Pulse: 71   Resp: 16 SpO2: 93 % O2 Device: None (Room air)    Weight: 90 lbs 0 oz    General: Thin, lying in bed  CV: +S1/S2, no pitting edema  Respiratory: clear to auscultation bilaterally, no wheezing/crackles  GI: soft, nontender, nondistended  Neuro: alert       Primary Care Physician   Physician No Ref-Primary    Discharge Orders      General info for SNF    Length of Stay Estimate: Short Term Care: Estimated # of Days <30  Condition at Discharge: Improving  Level of care:skilled   Rehabilitation Potential: Good  Admission H&P remains valid and up-to-date: Yes  Recent Chemotherapy: N/A  Use Nursing Home Standing Orders: Yes     Mantoux instructions    Give two-step Mantoux (PPD) Per Facility Policy Yes     Follow Up and recommended labs and tests    [facility].  The following labs/tests are recommended: Hemogram, BMP, Magnesium in 3-5 days of discharge.     Reason for your hospital stay    Atrial fibrillation with RVR, KATELIN and mild rhabdomyolysis     Activity - Up with nursing assistance     Physical Therapy Adult Consult    Evaluate and treat as clinically indicated.    Reason:  Weakness     Occupational Therapy Adult Consult    Evaluate and treat as clinically indicated.    Reason:  Weakness     Diet    Follow this diet upon discharge: Regular Diet Adult       Significant Results and Procedures   Most Recent 3 CBC's:  Recent Labs   Lab Test 09/02/23  0524 09/01/23  0555 08/31/23  0746   WBC 6.0 6.2 9.6   HGB 8.7* 9.5* 12.1   MCV 96 96 95   * 147* 174     Most Recent 3 BMP's:  Recent Labs   Lab Test 09/02/23  0524 09/01/23  1414  09/01/23  0555 08/31/23  1203     --  144 145   POTASSIUM 3.8  --  4.0 4.2   CHLORIDE 116*  --  113* 111*   CO2 24  --  25 24   BUN 39.2*  --  56.3* 66.1*   CR 0.64  --  0.79 0.88   ANIONGAP 5*  --  6* 10   JAZMÍN 7.8*  --  7.7* 8.4   GLC 94 150* 92 105*     Most Recent 2 LFT's:  Recent Labs   Lab Test 09/01/23  0555 08/31/23  0746   AST 64* 85*   ALT 24 26   ALKPHOS 36 44   BILITOTAL 1.2 1.7*     Most Recent 3 INR's:No lab results found.  Most Recent INR's and Anticoagulation Dosing History:  Anticoagulation Dose History           No data to display            ,   Results for orders placed or performed during the hospital encounter of 08/30/23   Head CT w/o contrast    Narrative    EXAM: CT HEAD W/O CONTRAST  LOCATION: [facility]  DATE: 8/31/2023    INDICATION: Traumatic injury. Confusion.  COMPARISON: None.  TECHNIQUE: Routine CT Head without IV contrast. Multiplanar reformats. Dose reduction techniques were used.    FINDINGS:  INTRACRANIAL CONTENTS: No intracranial hemorrhage, extraaxial collection, or mass effect.  No CT evidence of acute infarct. 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 Severe presumed chronic small vessel   ischemic changes. Mild to moderate generalized volume loss. No hydrocephalus.     VISUALIZED ORBITS/SINUSES/MASTOIDS: Prior bilateral cataract surgery. Visualized portions of the orbits are otherwise unremarkable. No paranasal sinus mucosal disease. No middle ear or mastoid effusion.    BONES/SOFT TISSUES: No acute abnormality.      Impression    IMPRESSION:  1.  No CT evidence for acute intracranial process.  2.  Chronic changes as above.   Cervical spine CT w/o contrast    Narrative    EXAM: CT THORACIC SPINE RECONSTRUCTED, CT LUMBAR SPINE RECONSTRUCTED, CT CERVICAL SPINE W/O CONTRAST  LOCATION: [facility]  DATE: 8/31/2023    INDICATION: Traumatic injury. Fall.  COMPARISON:  None.  TECHNIQUE:  1) Routine CT Cervical Spine without IV contrast. Multiplanar reformats. Dose reduction techniques were used.   2) Dedicated axial, sagittal, and coronal images of the Thoracic Spine were generated utilizing CT chest source data and are separately reviewed. Dose reduction techniques were used.   3) Dedicated axial, sagittal, and coronal images of the Lumbar Spine were generated utilizing CT abdomen pelvis source data and are separately reviewed. Dose reduction techniques were used.     FINDINGS:    CERVICAL SPINE CT:  VERTEBRA: Trace degenerative anterolisthesis of C6 on C7. Vertebral body heights are preserved. No evidence of an acute displaced fracture. Diffuse bony demineralization.     CANAL/FORAMINA: Multilevel degenerative changes without high-grade spinal canal stenosis. Multilevel bilateral neural foraminal narrowing with at least moderate bilateral stenosis at C3-C4 and C5-C6.    PARASPINAL: No prevertebral hematoma.     THORACIC SPINE CT:  VERTEBRA: Accentuation of the thoracic spine kyphosis. Multilevel bridging anterior osteophytes with ankylosis of the majority of the thoracic spine. Diffuse bony demineralization. No definite acute displaced fracture.     CANAL/FORAMINA: No high-grade spinal canal stenosis. Scattered mild bilateral neural foraminal narrowing.    PARASPINAL: See separately dictated chest CT.    LUMBAR SPINE CT:  VERTEBRA: Slight left apex curvature of the lumbar spine. Grade I retrolisthesis of L2 on L3 and anterolisthesis of L5 on S1. Vertebral body heights are preserved. The bones are diffusely demineralized. No evidence of an acute displaced fracture.     CANAL/FORAMINA: Mild degenerative changes of the lumbar spine, without high-grade spinal canal or neural foraminal narrowing.    PARASPINAL: See separately dictated CT abdomen and pelvis.      Impression    IMPRESSION:  CERVICAL SPINE CT:  1.  No evidence of an acute displaced fracture of the cervical  spine.    THORACIC SPINE CT:  1.  No evidence of an acute displaced fracture of the thoracic spine.    LUMBAR SPINE CT:  1.  No evidence of an acute displaced fracture of the lumbar spine.   CT Chest/Abdomen/Pelvis w Contrast    Narrative    EXAM: CT CHEST/ABDOMEN/PELVIS W CONTRAST  LOCATION: [facility]  DATE: 8/31/2023    INDICATION: fall. confusion, elevated HR, WBC  COMPARISON: None.  TECHNIQUE: CT scan of the chest, abdomen, and pelvis was performed following injection of IV contrast. Multiplanar reformats were obtained. Dose reduction techniques were used.   CONTRAST: 75ml Isovue 370    FINDINGS:   LUNGS AND PLEURA: Left lower lobe atelectasis adjacent to the patient's large hiatal hernia. No acute airspace infiltrate. No edema. No pneumothorax or pleural effusion.    MEDIASTINUM/AXILLAE: Mild cardiomegaly. Incidental note made of left-sided SVC, normal variant. No pericardial effusion. Large hiatal hernia containing the majority of the stomach. Ectatic thoracic aorta. There is some amorphous soft tissue density at   the leftward aspect of the thoracic inlet extending inferiorly into the prevascular space of the anterior mediastinum, measuring up to 3.2 x 2.0 cm on image 29 of series 4. There is a 1.4 cm hypodense left thyroid lobe nodule.    CORONARY ARTERY CALCIFICATION: Mild.    HEPATOBILIARY: Large gallstones in the gallbladder fundus. No focal liver injury.    PANCREAS: Normal.    SPLEEN: Normal.    ADRENAL GLANDS: Mild nonspecific adrenal thickening.    KIDNEYS/BLADDER: Benign right renal cortical cysts requiring no follow-up. No hydronephrosis. Bladder is normal.    BOWEL: Severe distal colonic diverticulosis. No free air.    LYMPH NODES: Normal.    VASCULATURE: Tortuous, mildly atherosclerotic abdominal aorta without aneurysm.    PELVIC ORGANS: Uterus is atrophic or absent. No free fluid.    MUSCULOSKELETAL: Severe osteopenia. Old right distal clavicle fracture. Marked  accentuation of the thoracic kyphosis. Moderate right and severe left hip arthritic change.      Impression    IMPRESSION:  1.  No acute, traumatic findings.    2.  Nonspecific soft tissue density at the thoracic inlet continuing inferiorly into the prevascular space of the anterior mediastinum, concerning for adenopathy or lymphoproliferative etiology such as lymphoma.    3.  1.4 cm left thyroid lobe nodule.    4.  Large hiatal hernia containing the majority of the stomach.    5.  Cholelithiasis.    6.  Severe colonic diverticulosis.   CT Thoracic Spine Reconstructed    Narrative    EXAM: CT THORACIC SPINE RECONSTRUCTED, CT LUMBAR SPINE RECONSTRUCTED, CT CERVICAL SPINE W/O CONTRAST  LOCATION: [facility]  DATE: 8/31/2023    INDICATION: Traumatic injury. Fall.  COMPARISON: None.  TECHNIQUE:  1) Routine CT Cervical Spine without IV contrast. Multiplanar reformats. Dose reduction techniques were used.   2) Dedicated axial, sagittal, and coronal images of the Thoracic Spine were generated utilizing CT chest source data and are separately reviewed. Dose reduction techniques were used.   3) Dedicated axial, sagittal, and coronal images of the Lumbar Spine were generated utilizing CT abdomen pelvis source data and are separately reviewed. Dose reduction techniques were used.     FINDINGS:    CERVICAL SPINE CT:  VERTEBRA: Trace degenerative anterolisthesis of C6 on C7. Vertebral body heights are preserved. No evidence of an acute displaced fracture. Diffuse bony demineralization.     CANAL/FORAMINA: Multilevel degenerative changes without high-grade spinal canal stenosis. Multilevel bilateral neural foraminal narrowing with at least moderate bilateral stenosis at C3-C4 and C5-C6.    PARASPINAL: No prevertebral hematoma.     THORACIC SPINE CT:  VERTEBRA: Accentuation of the thoracic spine kyphosis. Multilevel bridging anterior osteophytes with ankylosis of the majority of the thoracic spine. Diffuse  bony demineralization. No definite acute displaced fracture.     CANAL/FORAMINA: No high-grade spinal canal stenosis. Scattered mild bilateral neural foraminal narrowing.    PARASPINAL: See separately dictated chest CT.    LUMBAR SPINE CT:  VERTEBRA: Slight left apex curvature of the lumbar spine. Grade I retrolisthesis of L2 on L3 and anterolisthesis of L5 on S1. Vertebral body heights are preserved. The bones are diffusely demineralized. No evidence of an acute displaced fracture.     CANAL/FORAMINA: Mild degenerative changes of the lumbar spine, without high-grade spinal canal or neural foraminal narrowing.    PARASPINAL: See separately dictated CT abdomen and pelvis.      Impression    IMPRESSION:  CERVICAL SPINE CT:  1.  No evidence of an acute displaced fracture of the cervical spine.    THORACIC SPINE CT:  1.  No evidence of an acute displaced fracture of the thoracic spine.    LUMBAR SPINE CT:  1.  No evidence of an acute displaced fracture of the lumbar spine.   CT Lumbar Spine Reconstructed    Narrative    EXAM: CT THORACIC SPINE RECONSTRUCTED, CT LUMBAR SPINE RECONSTRUCTED, CT CERVICAL SPINE W/O CONTRAST  LOCATION: [facility]  DATE: 8/31/2023    INDICATION: Traumatic injury. Fall.  COMPARISON: None.  TECHNIQUE:  1) Routine CT Cervical Spine without IV contrast. Multiplanar reformats. Dose reduction techniques were used.   2) Dedicated axial, sagittal, and coronal images of the Thoracic Spine were generated utilizing CT chest source data and are separately reviewed. Dose reduction techniques were used.   3) Dedicated axial, sagittal, and coronal images of the Lumbar Spine were generated utilizing CT abdomen pelvis source data and are separately reviewed. Dose reduction techniques were used.     FINDINGS:    CERVICAL SPINE CT:  VERTEBRA: Trace degenerative anterolisthesis of C6 on C7. Vertebral body heights are preserved. No evidence of an acute displaced fracture. Diffuse bony  demineralization.     CANAL/FORAMINA: Multilevel degenerative changes without high-grade spinal canal stenosis. Multilevel bilateral neural foraminal narrowing with at least moderate bilateral stenosis at C3-C4 and C5-C6.    PARASPINAL: No prevertebral hematoma.     THORACIC SPINE CT:  VERTEBRA: Accentuation of the thoracic spine kyphosis. Multilevel bridging anterior osteophytes with ankylosis of the majority of the thoracic spine. Diffuse bony demineralization. No definite acute displaced fracture.     CANAL/FORAMINA: No high-grade spinal canal stenosis. Scattered mild bilateral neural foraminal narrowing.    PARASPINAL: See separately dictated chest CT.    LUMBAR SPINE CT:  VERTEBRA: Slight left apex curvature of the lumbar spine. Grade I retrolisthesis of L2 on L3 and anterolisthesis of L5 on S1. Vertebral body heights are preserved. The bones are diffusely demineralized. No evidence of an acute displaced fracture.     CANAL/FORAMINA: Mild degenerative changes of the lumbar spine, without high-grade spinal canal or neural foraminal narrowing.    PARASPINAL: See separately dictated CT abdomen and pelvis.      Impression    IMPRESSION:  CERVICAL SPINE CT:  1.  No evidence of an acute displaced fracture of the cervical spine.    THORACIC SPINE CT:  1.  No evidence of an acute displaced fracture of the thoracic spine.    LUMBAR SPINE CT:  1.  No evidence of an acute displaced fracture of the lumbar spine.   US Abdomen Limited    Narrative    EXAM: US ABDOMEN LIMITED  LOCATION: [facility]  DATE: 8/31/2023    INDICATION: Cholelithiasis, elevated bilirubin  COMPARISON: CT chest abdomen pelvis 08/31/2023  TECHNIQUE: Limited abdominal ultrasound.    FINDINGS:    GALLBLADDER: Gallstones in an otherwise normal gallbladder. No wall thickening, or pericholecystic fluid. Negative sonographic Fam's sign.    BILE DUCTS: No biliary dilatation. The common duct measures 4 mm.    LIVER: Normal parenchyma  with smooth contour. No focal mass.    RIGHT KIDNEY: No hydronephrosis. Right renal cysts.    PANCREAS: The visualized portions are normal.    No ascites.      Impression    IMPRESSION:  1.  Cholelithiasis. No features of acute cholecystitis. No other acute abnormalities.       US Thyroid    Narrative    EXAM: US THYROID  LOCATION: [facility]  DATE: 8/31/2023    INDICATION: Abnormal CT scan.  COMPARISON: CT from 08/31/2023.  TECHNIQUE: Thyroid ultrasound.     FINDINGS:  RIGHT lobe: 3.1 x 1.7 x 1.3 cm. Homogeneous echotexture.  Isthmus: 4 mm.  LEFT lobe: 3.1 x 1.7 x 1.3 cm. Homogeneous echotexture.    NECK: The indeterminate soft tissue nodular density seen at the left thoracic inlet on CT scan is not imaged on this study and therefore remains indeterminate.    NODULES:    Nodule 1: Left superior, 1.7 x 1.3 x 1.2 cm   Composition: Spongiform, 0 points   Echogenicity: Hyperechoic or isoechoic, 1 point   Shape: Wider-than-tall, 0 points   Margin: Smooth, 0 points   Echogenic Foci: None, or large comet-tail artifacts, 0 points   Point Total: 1-2 points. TI-RADS 2. No FNA.      Nodule 2: Left isthmus, 5 x 5 x 3 mm  Composition: Spongiform, 0 points   Echogenicity: Anechoic, 0 points   Shape: Wider-than-tall, 0 points   Margin: Smooth, 0 points   Echogenic Foci: None, or large comet-tail artifacts, 0 points   Point Total: 1-2 points. TI-RADS 2. No FNA.      Impression    IMPRESSION:  1.  There are couple benign thyroid nodules as detailed above with no specific follow-up needed.    2.  The indeterminate soft tissue nodular density seen at the left thoracic inlet on the earlier CT scan is not visualized on this study and remains indeterminate.      Nodules are characterized per  ACR Thyroid Imaging, Reporting and Data System (TI-RADS): White Paper of the ACR TI-RADS Committee  Charles Franklin et al. Journal of the American College of Radiology 2017. Volume 14 (2017), Issue 5, 737-040.                   Echocardiogram Complete     Value    LVEF  60-65%    [facility]    [id number]  [id number]  [id number]  [id number]^INDIANA^[place] [address]     Name: HEIDY WILKINS  MRN: [id number]  : 1928  Study Date: 2023 06:51 AM  Age: 89 yrs  Gender: Female  Patient Location: [facility]  Reason For Study: Atrial Fibrillation  Ordering Physician: DAVON BE  Performed By: GELA     BSA: 1.4 m2  Height: 63 in  Weight: 90 lb  HR: 86  ______________________________________________________________________________  Procedure  Complete Portable Echo Adult.  ______________________________________________________________________________  Interpretation Summary     Left ventricular function is normal.The ejection fraction is 60-65%.  There is mild concentric left ventricular hypertrophy.  There is mild to moderate (1-2+) tricuspid regurgitation.  Right ventricular systolic pressure is elevated, consistent with moderate  pulmonary hypertension.  Normal right ventricle size and systolic function.  Left ventricular diastolic function is abnormal.  The aortic valve is trileaflet with aortic valve sclerosis.  There is mild to moderate (1-2+) aortic regurgitation.  No hemodynamically significant valvular aortic stenosis.  The mitral valve leaflets are mildly thickened.  There is mild to moderate (1-2+) mitral regurgitation.  ______________________________________________________________________________  Left Ventricle  Left ventricular function is normal.The ejection fraction is 60-65%. There is  mild concentric left ventricular hypertrophy. Left ventricular diastolic  function is abnormal. No regional wall motion abnormalities noted.     Right Ventricle  Normal right ventricle size and systolic function.     Atria  The left atrium is severely dilated. Right atrial size is normal.     Mitral Valve  The mitral valve leaflets are mildly thickened. There is mild to moderate  (1-  2+) mitral regurgitation.     Tricuspid Valve  The tricuspid valve is not well visualized, but is grossly normal. There is  mild to moderate (1-2+) tricuspid regurgitation. Right ventricular systolic  pressure is elevated, consistent with moderate pulmonary hypertension.     Aortic Valve  The aortic valve is trileaflet with aortic valve sclerosis. There is mild to  moderate (1-2+) aortic regurgitation. No hemodynamically significant valvular  aortic stenosis.     Pulmonic Valve  The pulmonic valve is not well seen, but is grossly normal.     Vessels  The aorta root is normal. Normal size ascending aorta. IVC diameter <2.1 cm  collapsing >50% with sniff suggests a normal RA pressure of 3 mmHg.     Pericardium  There is no pericardial effusion.     ______________________________________________________________________________  MMode/2D Measurements & Calculations  IVSd: 1.3 cm  LVIDd: 2.9 cm  LVIDs: 2.4 cm  LVPWd: 1.4 cm     FS: 18.6 %  LV mass(C)d: 130.2 grams  LV mass(C)dI: 94.5 grams/m2  Ao root diam: 3.4 cm  LA dimension: 2.4 cm  asc Aorta Diam: 2.9 cm  LA/Ao: 0.71  LVOT diam: 1.9 cm  LVOT area: 2.8 cm2  Ao root diam Index (cm/m2): 2.5  asc Aorta Diam Index (cm/m2): 2.1  LA Volume Indexed (AL/bp): 31.5 ml/m2  RV Base: 3.0 cm  RWT: 0.97     TAPSE: 1.4 cm     Doppler Measurements & Calculations  MV E max kofi: 78.6 cm/sec  MV A max kofi: 115.0 cm/sec  MV E/A: 0.68  MV max P.5 mmHg  MV mean P.0 mmHg  MV V2 VTI: 22.1 cm  MVA(VTI): 2.4 cm2  MV dec slope: 312.0 cm/sec2  MV dec time: 0.25 sec  Ao V2 max: 170.7 cm/sec  Ao max P.0 mmHg  Ao V2 mean: 126.0 cm/sec  Ao mean P.0 mmHg  Ao V2 VTI: 34.8 cm  PANDA(I,D): 1.5 cm2  PANDA(V,D): 1.7 cm2  AI P1/2t: 566.3 msec  LV V1 max P.4 mmHg  LV V1 max: 104.7 cm/sec  LV V1 VTI: 18.5 cm  SV(LVOT): 52.5 ml  SI(LVOT): 38.2 ml/m2  PA acc time: 0.09 sec  TR max kofi: 332.0 cm/sec  TR max P.1 mmHg  AV Kofi Ratio (DI): 0.61  PANDA Index (cm2/m2): 1.1  E/E' avg:  18.2  Lateral E/e': 13.1     Medial E/e': 23.3  RV S Kofi: 15.9 cm/sec     ______________________________________________________________________________  Report approved by: Jase Marquis 08/31/2023 09:21 AM             Discharge Medications   Current Discharge Medication List        START taking these medications    Details   apixaban ANTICOAGULANT (ELIQUIS) 2.5 MG tablet Take 1 tablet (2.5 mg) by mouth 2 times daily    Associated Diagnoses: Atrial fibrillation with RVR (H)      metoprolol succinate ER (TOPROL XL) 25 MG 24 hr tablet Take 0.5 tablets (12.5 mg) by mouth daily    Associated Diagnoses: Atrial fibrillation with RVR (H)      multivitamin w/minerals (THERA-VIT-M) tablet Take 1 tablet by mouth daily    Associated Diagnoses: Underweight      thiamine 50 MG TABS Take 1 tablet (50 mg) by mouth daily for 10 days  Qty: 10 tablet, Refills: 0    Associated Diagnoses: Underweight      zinc sulfate (ZINCATE) 220 (50 Zn) MG capsule Take 1 capsule (220 mg) by mouth daily for 10 days    Associated Diagnoses: Underweight           Allergies   No Known Allergies

## 2023-09-02 NOTE — PROGRESS NOTES
MEI spoke with son, Alex, to confirm that pt will be discharging to [facility] and that ride will be here [id number]. SW left message for admissions; all parties aware and agreeable to discharge plan PAS done. No other needs from care management.  10:00 AM    KAY Ronquillo  9/2/2023

## 2023-09-02 NOTE — PLAN OF CARE
Goal Outcome Evaluation:                  Physical Therapy Discharge Summary    Reason for therapy discharge:    Discharged to transitional care facility.    Progress towards therapy goal(s). See goals on Care Plan in Epic electronic health record for goal details.  Goals met    Therapy recommendation(s):    Continued therapy is recommended.  Rationale/Recommendations:  Has not returned to baseline mobility.

## 2023-09-02 NOTE — PLAN OF CARE
Problem: Plan of Care - These are the overarching goals to be used throughout the patient stay.    Goal: Plan of Care Review  Description: The Plan of Care Review/Shift note should be completed every shift.  The Outcome Evaluation is a brief statement about your assessment that the patient is improving, declining, or no change.  This information will be displayed automatically on your shift note.  Outcome: Progressing   Goal Outcome Evaluation:         Pt alert, intermittent confusion, denies pain, SOB, headache, NS d/c, slept between cares

## 2023-09-02 NOTE — PLAN OF CARE
Occupational Therapy Discharge Summary    Reason for therapy discharge:    Discharged to transitional care facility.    Progress towards therapy goal(s). See goals on Care Plan in [facility] electronic health record for goal details.  Goals partially met.  Barriers to achieving goals:   discharge from facility.    Therapy recommendation(s):    Continued therapy is recommended.  Rationale/Recommendations:  to improve ADL independence.

## 2023-09-05 ENCOUNTER — LAB REQUISITION (OUTPATIENT)
Dept: LAB | Facility: CLINIC | Age: 88
End: 2023-09-05
Payer: COMMERCIAL

## 2023-09-05 DIAGNOSIS — D72.829 ELEVATED WHITE BLOOD CELL COUNT, UNSPECIFIED: ICD-10-CM

## 2023-09-05 DIAGNOSIS — L89.109 PRESSURE ULCER OF UNSPECIFIED PART OF BACK, UNSPECIFIED STAGE: ICD-10-CM

## 2023-09-05 LAB
BACTERIA BLD CULT: NO GROWTH
BACTERIA BLD CULT: NO GROWTH

## 2023-09-06 ENCOUNTER — TRANSITIONAL CARE UNIT VISIT (OUTPATIENT)
Dept: GERIATRICS | Facility: CLINIC | Age: 88
End: 2023-09-06
Payer: COMMERCIAL

## 2023-09-06 VITALS
WEIGHT: 100.8 LBS | BODY MASS INDEX: 17.86 KG/M2 | HEIGHT: 63 IN | DIASTOLIC BLOOD PRESSURE: 76 MMHG | SYSTOLIC BLOOD PRESSURE: 125 MMHG | TEMPERATURE: 96.4 F | HEART RATE: 78 BPM | OXYGEN SATURATION: 97 % | RESPIRATION RATE: 16 BRPM

## 2023-09-06 DIAGNOSIS — R60.0 LOCALIZED EDEMA: ICD-10-CM

## 2023-09-06 DIAGNOSIS — I48.91 ATRIAL FIBRILLATION WITH RVR (H): ICD-10-CM

## 2023-09-06 DIAGNOSIS — R62.7 FAILURE TO THRIVE IN ADULT: Primary | ICD-10-CM

## 2023-09-06 LAB
ANION GAP SERPL CALCULATED.3IONS-SCNC: 7 MMOL/L (ref 7–15)
BUN SERPL-MCNC: 30.4 MG/DL (ref 8–23)
CALCIUM SERPL-MCNC: 8.2 MG/DL (ref 8.2–9.6)
CHLORIDE SERPL-SCNC: 111 MMOL/L (ref 98–107)
CREAT SERPL-MCNC: 0.7 MG/DL (ref 0.51–0.95)
DEPRECATED HCO3 PLAS-SCNC: 26 MMOL/L (ref 22–29)
EGFRCR SERPLBLD CKD-EPI 2021: 79 ML/MIN/1.73M2
GLUCOSE SERPL-MCNC: 68 MG/DL (ref 70–99)
HGB BLD-MCNC: 8.3 G/DL (ref 11.7–15.7)
MAGNESIUM SERPL-MCNC: 2 MG/DL (ref 1.7–2.3)
POTASSIUM SERPL-SCNC: 4 MMOL/L (ref 3.4–5.3)
SODIUM SERPL-SCNC: 144 MMOL/L (ref 136–145)

## 2023-09-06 PROCEDURE — 83735 ASSAY OF MAGNESIUM: CPT | Performed by: INTERNAL MEDICINE

## 2023-09-06 PROCEDURE — 80048 BASIC METABOLIC PNL TOTAL CA: CPT | Performed by: INTERNAL MEDICINE

## 2023-09-06 PROCEDURE — 36415 COLL VENOUS BLD VENIPUNCTURE: CPT | Performed by: INTERNAL MEDICINE

## 2023-09-06 PROCEDURE — 85018 HEMOGLOBIN: CPT | Performed by: INTERNAL MEDICINE

## 2023-09-06 PROCEDURE — P9603 ONE-WAY ALLOW PRORATED MILES: HCPCS | Performed by: INTERNAL MEDICINE

## 2023-09-06 PROCEDURE — 99309 SBSQ NF CARE MODERATE MDM 30: CPT | Performed by: NURSE PRACTITIONER

## 2023-09-06 NOTE — PROGRESS NOTES
[facility] GERIATRICS    PRIMARY CARE PROVIDER AND CLINIC:  Physician No Ref-Primary, No address on file  Chief Complaint   Patient presents with    Hospital F/U      [place] Medical Record Number:  [id number]  Place of Service where encounter took place:  [facility] ([facility]) [[id number]]    Elham Manning  is a 89 year old  (5/8/1928), admitted to the above facility from  [facility]. Hospital stay 8/30/23 through 9/2/23. Patient with limited healthcare visits for years, last appointment was a Medicare annual visit 4/2021. 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 Some mild cognitive impairment was noted at that visit in 2021. She was brought to the ED after being found sitting on the toilet for unknown duration. She was admitted with failure to thrive, afib with RVR, and mild rhabdomyolysis. She was started on metoprolol and apixaban. During workup CT showed soft tissue density and thoracic inlet. Family did not wan to pursue further workup.     HPI obtained from patient visit, review of nursing home record, discussion with facility staff, and Epic review.     HPI:    Patient is laying in bed with covers over her head. She removes them slightly and says she is feeling fine, just tired. She puts the covers back and does not answer further. Nursing reports that she has not been eating well. She is confused. Provider was not able to examine her legs, but nursing reports some weeping, pain and edema.     CODE STATUS/ADVANCE DIRECTIVES DISCUSSION:  No CPR- Do NOT Intubate    ALLERGIES: No Known Allergies   PAST MEDICAL HISTORY: No past medical history on file.   PAST SURGICAL HISTORY:   has no past surgical history on file.      Post Discharge Medication Reconciliation Status:   MED REC REQUIRED  Post Medication Reconciliation Status:  Discharge medications reconciled and changed, see notes/orders       Current Outpatient  "Medications   Medication Sig    apixaban ANTICOAGULANT (ELIQUIS) 2.5 MG tablet Take 1 tablet (2.5 mg) by mouth 2 times daily    metoprolol succinate ER (TOPROL XL) 25 MG 24 hr tablet Take 0.5 tablets (12.5 mg) by mouth daily    multivitamin w/minerals (THERA-VIT-M) tablet Take 1 tablet by mouth daily    thiamine 50 MG TABS Take 1 tablet (50 mg) by mouth daily for 10 days    zinc sulfate (ZINCATE) 220 (50 Zn) MG capsule Take 1 capsule (220 mg) by mouth daily for 10 days     No current facility-administered medications for this visit.       ROS:  Unobtainable secondary to cognitive impairment.     Vitals:  /76   Pulse 78   Temp (!) 96.4  F (35.8  C)   Resp 16   Ht 1.6 m (5' 3\")   Wt 45.7 kg (100 lb 12.8 oz)   SpO2 97%   BMI 17.86 kg/m    Exam:  GENERAL APPEARANCE:  Resting in bed with covers over head, arouses to touch, only removes covers briefly. No apparent distress  RESP:  no respiratory distress  PSYCH:  insight and judgement impaired, memory impaired , affect abnormal flat    Lab/Diagnostic data:  Recent labs in [facility] reviewed by me today.     ASSESSMENT/PLAN:  (R62.7) Failure to thrive in adult  (primary encounter diagnosis)  Comment: Likely due to cognitive impairment and possible depression reported by family. Will start Remeron for mood and appetite.   Plan: Remeron 7.5mg at bedtime. PT/OT eval and treat, discharge planning per their recommendations.    (R60.0) Localized edema  Comment: Did not view today, but she is certainly at risk for edema due to malnutrition and IVFs.   Plan: ACE wraps daily. Could consider diuretic if no improvement.     (I48.91) Atrial fibrillation with RVR (H)  Comment: HR controlled  Plan: Continue current POC with no changes at this time and adjustments as needed.        Orders:  Remeron 7.5mg at bedtime  ACE wraps to BLE  BMP 9/11/23      Electronically signed by:  Farida Landin, HENRY CNP                 "

## 2023-09-06 NOTE — LETTER
9/6/2023        RE: Elham Manning  [address]  [place] [address]        [facility] GERIATRICS    PRIMARY CARE PROVIDER AND CLINIC:  Physician No Ref-Primary, No address on file  Chief Complaint   Patient presents with     Hospital F/U      [place] Medical Record Number:  [id number]  Place of Service where encounter took place:  [facility] ([facility]) [[id number]]    Elham Manning  is a 89 year old  (5/8/1928), admitted to the above facility from  [facility]. Hospital stay 8/30/23 through 9/2/23. Patient with limited healthcare visits for years, last appointment was a Medicare annual visit 4/2021. 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 Some mild cognitive impairment was noted at that visit in 2021. She was brought to the ED after being found sitting on the toilet for unknown duration. She was admitted with failure to thrive, afib with RVR, and mild rhabdomyolysis. She was started on metoprolol and apixaban. During workup CT showed soft tissue density and thoracic inlet. Family did not wan to pursue further workup.     HPI obtained from patient visit, review of nursing home record, discussion with facility staff, and Epic review.     HPI:    Patient is laying in bed with covers over her head. She removes them slightly and says she is feeling fine, just tired. She puts the covers back and does not answer further. Nursing reports that she has not been eating well. She is confused. Provider was not able to examine her legs, but nursing reports some weeping, pain and edema.     CODE STATUS/ADVANCE DIRECTIVES DISCUSSION:  No CPR- Do NOT Intubate    ALLERGIES: No Known Allergies   PAST MEDICAL HISTORY: No past medical history on file.   PAST SURGICAL HISTORY:   has no past surgical history on file.      Post Discharge Medication Reconciliation Status:   MED REC REQUIRED  Post Medication Reconciliation Status:   "Discharge medications reconciled and changed, see notes/orders       Current Outpatient Medications   Medication Sig     apixaban ANTICOAGULANT (ELIQUIS) 2.5 MG tablet Take 1 tablet (2.5 mg) by mouth 2 times daily     metoprolol succinate ER (TOPROL XL) 25 MG 24 hr tablet Take 0.5 tablets (12.5 mg) by mouth daily     multivitamin w/minerals (THERA-VIT-M) tablet Take 1 tablet by mouth daily     thiamine 50 MG TABS Take 1 tablet (50 mg) by mouth daily for 10 days     zinc sulfate (ZINCATE) 220 (50 Zn) MG capsule Take 1 capsule (220 mg) by mouth daily for 10 days     No current facility-administered medications for this visit.       ROS:  Unobtainable secondary to cognitive impairment.     Vitals:  /76   Pulse 78   Temp (!) 96.4  F (35.8  C)   Resp 16   Ht 1.6 m (5' 3\")   Wt 45.7 kg (100 lb 12.8 oz)   SpO2 97%   BMI 17.86 kg/m    Exam:  GENERAL APPEARANCE:  Resting in bed with covers over head, arouses to touch, only removes covers briefly. No apparent distress  RESP:  no respiratory distress  PSYCH:  insight and judgement impaired, memory impaired , affect abnormal flat    Lab/Diagnostic data:  Recent labs in [facility] reviewed by me today.     ASSESSMENT/PLAN:  (R62.7) Failure to thrive in adult  (primary encounter diagnosis)  Comment: Likely due to cognitive impairment and possible depression reported by family. Will start Remeron for mood and appetite.   Plan: Remeron 7.5mg at bedtime. PT/OT eval and treat, discharge planning per their recommendations.    (R60.0) Localized edema  Comment: Did not view today, but she is certainly at risk for edema due to malnutrition and IVFs.   Plan: ACE wraps daily. Could consider diuretic if no improvement.     (I48.91) Atrial fibrillation with RVR (H)  Comment: HR controlled  Plan: Continue current POC with no changes at this time and adjustments as needed.        Orders:  Remeron 7.5mg at bedtime  ACE wraps to BLE  BMP 9/11/23      Electronically signed by:  Farida Aviles " HENRY Landin CNP                     Sincerely,        HENRY Pena CNP

## 2023-09-07 RX ORDER — MIRTAZAPINE 7.5 MG/1
7.5 TABLET, FILM COATED ORAL AT BEDTIME
Start: 2023-09-07

## 2023-09-10 ENCOUNTER — LAB REQUISITION (OUTPATIENT)
Dept: LAB | Facility: CLINIC | Age: 88
End: 2023-09-10
Payer: COMMERCIAL

## 2023-09-10 DIAGNOSIS — R60.9 EDEMA, UNSPECIFIED: ICD-10-CM

## 2023-09-11 ENCOUNTER — TRANSITIONAL CARE UNIT VISIT (OUTPATIENT)
Dept: GERIATRICS | Facility: CLINIC | Age: 88
End: 2023-09-11
Payer: COMMERCIAL

## 2023-09-11 VITALS
BODY MASS INDEX: 17.86 KG/M2 | DIASTOLIC BLOOD PRESSURE: 73 MMHG | RESPIRATION RATE: 18 BRPM | HEART RATE: 76 BPM | OXYGEN SATURATION: 94 % | SYSTOLIC BLOOD PRESSURE: 152 MMHG | WEIGHT: 100.8 LBS | HEIGHT: 63 IN | TEMPERATURE: 96.8 F

## 2023-09-11 DIAGNOSIS — R60.0 LOCALIZED EDEMA: ICD-10-CM

## 2023-09-11 DIAGNOSIS — I48.91 ATRIAL FIBRILLATION WITH RVR (H): ICD-10-CM

## 2023-09-11 DIAGNOSIS — R62.7 FAILURE TO THRIVE IN ADULT: Primary | ICD-10-CM

## 2023-09-11 LAB
ANION GAP SERPL CALCULATED.3IONS-SCNC: 6 MMOL/L (ref 7–15)
BUN SERPL-MCNC: 21.5 MG/DL (ref 8–23)
CALCIUM SERPL-MCNC: 8.2 MG/DL (ref 8.2–9.6)
CHLORIDE SERPL-SCNC: 107 MMOL/L (ref 98–107)
CREAT SERPL-MCNC: 0.66 MG/DL (ref 0.51–0.95)
DEPRECATED HCO3 PLAS-SCNC: 28 MMOL/L (ref 22–29)
EGFRCR SERPLBLD CKD-EPI 2021: 80 ML/MIN/1.73M2
GLUCOSE SERPL-MCNC: 75 MG/DL (ref 70–99)
POTASSIUM SERPL-SCNC: 4.1 MMOL/L (ref 3.4–5.3)
SODIUM SERPL-SCNC: 141 MMOL/L (ref 136–145)

## 2023-09-11 PROCEDURE — 80048 BASIC METABOLIC PNL TOTAL CA: CPT | Performed by: INTERNAL MEDICINE

## 2023-09-11 PROCEDURE — P9603 ONE-WAY ALLOW PRORATED MILES: HCPCS | Performed by: INTERNAL MEDICINE

## 2023-09-11 PROCEDURE — 99309 SBSQ NF CARE MODERATE MDM 30: CPT | Performed by: NURSE PRACTITIONER

## 2023-09-11 PROCEDURE — 36415 COLL VENOUS BLD VENIPUNCTURE: CPT | Performed by: INTERNAL MEDICINE

## 2023-09-11 RX ORDER — ACETAMINOPHEN 500 MG
1000 TABLET ORAL 2 TIMES DAILY
COMMUNITY

## 2023-09-11 NOTE — PROGRESS NOTES
"[facility] GERIATRICS    Chief Complaint   Patient presents with    RECHECK     HPI:  Elham Manning is a 89 year old  (5/8/1928), who is being seen today for an episodic care visit at: [facility] ([facility]) [[id number]]. [facility] stay 8/30/23 through 9/2/23. Patient with limited healthcare visits for years, last appointment was a Medicare annual visit 4/2021. 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 Some mild cognitive impairment was noted at that visit in 2021. She was brought to the ED after being found sitting on the toilet for unknown duration. She was admitted with failure to thrive, afib with RVR, and mild rhabdomyolysis. She was started on metoprolol and apixaban. During workup CT showed soft tissue density and thoracic inlet. Family did not wan to pursue further workup.     Today's concern is:   Patient is alert and cooperative today. She has no concerns. She says her appetite is good, she is sleeping well. Last week nursing reported BLE edema, pain and weeping. She denies any pain, but when her right ankle is palpated, she yells \"ow.\" No pain on the left ankle.     Allergies, and PMH/PSH reviewed in EPIC today.  REVIEW OF SYSTEMS:  Limited secondary to cognitive impairment but today pt reports 4 point ROS including Respiratory, CV, GI and , other than that noted in the HPI,  is negative    Objective:   BP (!) 152/73   Pulse 76   Temp 96.8  F (36  C)   Resp 18   Ht 1.6 m (5' 3\")   Wt 45.7 kg (100 lb 12.8 oz)   SpO2 94%   BMI 17.86 kg/m    GENERAL APPEARANCE:  Alert, in no distress  ENT:  Mouth and posterior oropharynx normal, moist mucous membranes  EYES:  EOM normal, conjunctiva and lids normal  RESP:  no respiratory distress  CV:  peripheral edema 1+ in RLE, irregular rhythm (afib), venous stasis skin changes to lower legs  PSYCH:  insight and judgement impaired, memory impaired , affect and mood " normal    Recent labs in [facility] reviewed by me today.     Assessment/Plan:  (R62.7) Failure to thrive in adult  (primary encounter diagnosis)  Comment: Likely due to progressive dementia and depression. Expect that she will require GEORGE vs LTC placement  Plan: Continue Remeron. PT/OT eval and treat, discharge planning per their recommendations.    (R60.0) Localized edema  Comment: Currently appears mild. Expect that the exam on her right leg is chronic due to her previous stroke.   Plan: Monitor edema, skin, pain    (I48.91) Atrial fibrillation with RVR (H)  Comment: HR controlled. No s/sx side effects with DOAC. No contraindication to AC at this time  Plan: Continue current POC with no changes at this time and adjustments as needed.      Electronically signed by: HENRY Pena CNP

## 2023-09-11 NOTE — LETTER
"    9/11/2023        RE: Elham Manning  [address]  [place] [address]        [facility] GERIATRICS    Chief Complaint   Patient presents with     RECHECK     HPI:  Elham Manning is a 89 year old  (5/8/1928), who is being seen today for an episodic care visit at: [facility] ([facility]) [[id number]]. [facility] stay 8/30/23 through 9/2/23. Patient with limited healthcare visits for years, last appointment was a Medicare annual visit 4/2021. 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 Some mild cognitive impairment was noted at that visit in 2021. She was brought to the ED after being found sitting on the toilet for unknown duration. She was admitted with failure to thrive, afib with RVR, and mild rhabdomyolysis. She was started on metoprolol and apixaban. During workup CT showed soft tissue density and thoracic inlet. Family did not wan to pursue further workup.     Today's concern is:   Patient is alert and cooperative today. She has no concerns. She says her appetite is good, she is sleeping well. Last week nursing reported BLE edema, pain and weeping. She denies any pain, but when her right ankle is palpated, she yells \"ow.\" No pain on the left ankle.     Allergies, and PMH/PSH reviewed in EPIC today.  REVIEW OF SYSTEMS:  Limited secondary to cognitive impairment but today pt reports 4 point ROS including Respiratory, CV, GI and , other than that noted in the HPI,  is negative    Objective:   BP (!) 152/73   Pulse 76   Temp 96.8  F (36  C)   Resp 18   Ht 1.6 m (5' 3\")   Wt 45.7 kg (100 lb 12.8 oz)   SpO2 94%   BMI 17.86 kg/m    GENERAL APPEARANCE:  Alert, in no distress  ENT:  Mouth and posterior oropharynx normal, moist mucous membranes  EYES:  EOM normal, conjunctiva and lids normal  RESP:  no respiratory distress  CV:  peripheral edema 1+ in RLE, irregular rhythm (afib), venous stasis skin changes to lower " legs  PSYCH:  insight and judgement impaired, memory impaired , affect and mood normal    Recent labs in [facility]IC reviewed by me today.     Assessment/Plan:  (R62.7) Failure to thrive in adult  (primary encounter diagnosis)  Comment: Likely due to progressive dementia and depression. Expect that she will require GEORGE vs LTC placement  Plan: Continue Remeron. PT/OT eval and treat, discharge planning per their recommendations.    (R60.0) Localized edema  Comment: Currently appears mild. Expect that the exam on her right leg is chronic due to her previous stroke.   Plan: Monitor edema, skin, pain    (I48.91) Atrial fibrillation with RVR (H)  Comment: HR controlled. No s/sx side effects with DOAC. No contraindication to AC at this time  Plan: Continue current POC with no changes at this time and adjustments as needed.      Electronically signed by: HENRY Pena CNP           Sincerely,        HENRY Pena CNP

## 2023-09-20 ENCOUNTER — TRANSITIONAL CARE UNIT VISIT (OUTPATIENT)
Dept: GERIATRICS | Facility: CLINIC | Age: 88
End: 2023-09-20
Payer: COMMERCIAL

## 2023-09-20 VITALS
SYSTOLIC BLOOD PRESSURE: 145 MMHG | HEART RATE: 65 BPM | TEMPERATURE: 98 F | RESPIRATION RATE: 17 BRPM | BODY MASS INDEX: 17.06 KG/M2 | HEIGHT: 63 IN | DIASTOLIC BLOOD PRESSURE: 70 MMHG | WEIGHT: 96.3 LBS | OXYGEN SATURATION: 94 %

## 2023-09-20 DIAGNOSIS — R62.7 ADULT FAILURE TO THRIVE: Primary | ICD-10-CM

## 2023-09-20 DIAGNOSIS — E46 PROTEIN-CALORIE MALNUTRITION, UNSPECIFIED SEVERITY (H): ICD-10-CM

## 2023-09-20 DIAGNOSIS — L89.109 PRESSURE INJURY OF SKIN OF BACK, UNSPECIFIED INJURY STAGE: ICD-10-CM

## 2023-09-20 DIAGNOSIS — R41.89 COGNITIVE IMPAIRMENT: ICD-10-CM

## 2023-09-20 DIAGNOSIS — R53.81 PHYSICAL DECONDITIONING: ICD-10-CM

## 2023-09-20 DIAGNOSIS — I48.0 PAROXYSMAL ATRIAL FIBRILLATION (H): ICD-10-CM

## 2023-09-20 DIAGNOSIS — F03.90 DEMENTIA WITHOUT BEHAVIORAL DISTURBANCE, PSYCHOTIC DISTURBANCE, MOOD DISTURBANCE, OR ANXIETY, UNSPECIFIED DEMENTIA SEVERITY, UNSPECIFIED DEMENTIA TYPE (H): ICD-10-CM

## 2023-09-20 PROCEDURE — 99305 1ST NF CARE MODERATE MDM 35: CPT | Performed by: INTERNAL MEDICINE

## 2023-09-20 NOTE — LETTER
9/20/2023        RE: Elham Manning  [address] [place] [address]        [facility] GERIATRICS  INITIAL VISIT NOTE  September 20, 2023    PRIMARY CARE PROVIDER AND CLINIC:  No Ref-Primary, Physician No address on file    [facility] Medical Record Number:  [id number]  Place of Service where encounter took place:  [facility] ([facility]) [[id number]]    Chief Complaint   Patient presents with     Hospital F/U       HPI:    Elham Manning is a 89 year old  (5/8/1928) female seen today at [facility]. No recent medical care. She was hospitalized at [facility] from 8/30/23 to 9/2/23 where she was admitted with a-fib with RVR, rhabdo, thoracic spine pressure ulcers after being found sitting on a toilet for unknown duration of time. TTE with EF 60-65%, moderate pulmonary HTN and mild valvular disease. Imaging with incidental finding of soft tissue density at thoracic inlet with differential of adenopathy vs lymphoproliferative etiology vs lymphoma. Family elected no further workup. She is new on apixaban and metoprolol. She was admitted to this facility for  rehab, medical management, and nursing care.      History obtained from: facility chart records, facility staff, patient report, and [facility] chart review.      Today, Ms. Manning is seen in her room sitting in a wheelchair. She is a limited historian - BIMS 2/15 and SLUMS 3/30 - due to dementia. She reports no aches or pains. No trouble breathing. No concerns today per discussion with nursing. She is working with therapies. Anticipate will discharge to a memory care AL.     CODE STATUS: DNR / DNI    ALLERGIES:  No Known Allergies    PAST MEDICAL HISTORY:   No recent medical care    PAST SURGICAL HISTORY:   No recent medical care    SOCIAL HISTORY:   Patient's living condition: , lives in single level town home. Uses a walker at baseline.     MEDICATIONS:  Post Discharge Medication Reconciliation Status: discharge medications  "reconciled and changed, per note/orders.  Current Outpatient Medications   Medication Sig Dispense Refill     acetaminophen (TYLENOL) 500 MG tablet Take 1,000 mg by mouth 2 times daily       apixaban ANTICOAGULANT (ELIQUIS) 2.5 MG tablet Take 1 tablet (2.5 mg) by mouth 2 times daily       metoprolol succinate ER (TOPROL XL) 25 MG 24 hr tablet Take 0.5 tablets (12.5 mg) by mouth daily       mirtazapine (REMERON) 7.5 MG tablet Take 1 tablet (7.5 mg) by mouth At Bedtime       multivitamin w/minerals (THERA-VIT-M) tablet Take 1 tablet by mouth daily         ROS:  Unable to obtain due to cognitive impairment or aphasia. BIMS 2/15.     PHYSICAL EXAM:  BP (!) 145/70   Pulse 65   Temp 98  F (36.7  C)   Resp 17   Ht 1.6 m (5' 3\")   Wt 43.7 kg (96 lb 4.8 oz)   SpO2 94%   BMI 17.06 kg/m    Gen: sitting in wheelchair, alert, cooperative and in no acute distress  Card: RRR, S1, S2, no murmurs  Resp: lungs clear to auscultation bilaterally, no crackles or wheezes  Ext: mild dependent LE edema with stasis changes   Neuro: CX II-XII grossly in tact; ROM in all four extremities grossly in tact  Psych: memory, judgement and insight impaired    LABORATORY/IMAGING DATA:  Reviewed as per [facility] and/or [facility]    ASSESSMENT/PLAN:    Dementia Without Behavioral Disturbance  BIMS 2/15 and SLUMS 3/30.   -- will need memory care vs SNF at discharge  -- OT following    Paroxysmal Atrial Fibrillation - NEW Diagnosis  HR 60s-70s. New on beta blocker and DOAC  -- metoprolol XL 12.5 mg daily   -- apixaban 2.5 mg BID    Thoracic Pressure Ulcers  Present on admission secondary to being found on a toilet.   -- wound cares as ordered    Failure to Thrive  Protein Calorie Malnutrition  BMI 17  -- new on mirtazapine 7.5 mg at bedtime at TCU on 9/6/23  -- nutrition following    Bilateral LE Edema  In setting of malnutrition as well as IV fluids for rhabdo. Weight 101 --> 99 --> 92 lbs. Mild dependent today.   -- follow clinically "     Thoracic Inlet Soft Tissue Density  Incidental finding. Differential of adenopathy vs lymphoproliferative etiology vs lymphoma  -- noted; family elected no further work up given her age    Physical Deconditioning  In setting of hospitalization and underlying medical conditions  -- ongoing PT/OT    Electronically signed by:  Olesya Boo MD                          Sincerely,        Olesya Boo MD

## 2023-09-20 NOTE — PROGRESS NOTES
[facility] GERIATRICS  INITIAL VISIT NOTE  September 20, 2023    PRIMARY CARE PROVIDER AND CLINIC:  No Ref-Primary, Physician No address on file    [facility] Medical Record Number:  [id number]  Place of Service where encounter took place:  [facility] ([facility]) [[id number]]    Chief Complaint   Patient presents with    Hospital F/U       HPI:    Elham Manning is a 89 year old  (5/8/1928) female seen today at [facility]. No recent medical care. She was hospitalized at [facility] from 8/30/23 to 9/2/23 where she was admitted with a-fib with RVR, rhabdo, thoracic spine pressure ulcers after being found sitting on a toilet for unknown duration of time. TTE with EF 60-65%, moderate pulmonary HTN and mild valvular disease. Imaging with incidental finding of soft tissue density at thoracic inlet with differential of adenopathy vs lymphoproliferative etiology vs lymphoma. Family elected no further workup. She is new on apixaban and metoprolol. She was admitted to this facility for  rehab, medical management, and nursing care.      History obtained from: facility chart records, facility staff, patient report, and [facility] chart review.      Today, Ms. Manning is seen in her room sitting in a wheelchair. She is a limited historian - BIMS 2/15 and SLUMS 3/30 - due to dementia. She reports no aches or pains. No trouble breathing. No concerns today per discussion with nursing. She is working with therapies. Anticipate will discharge to a memory care AL.     CODE STATUS: DNR / DNI    ALLERGIES:  No Known Allergies    PAST MEDICAL HISTORY:   No recent medical care    PAST SURGICAL HISTORY:   No recent medical care    SOCIAL HISTORY:   Patient's living condition: , lives in single level town home. Uses a walker at baseline.     MEDICATIONS:  Post Discharge Medication Reconciliation Status: discharge medications reconciled and changed, per note/orders.  Current Outpatient Medications  "  Medication Sig Dispense Refill    acetaminophen (TYLENOL) 500 MG tablet Take 1,000 mg by mouth 2 times daily      apixaban ANTICOAGULANT (ELIQUIS) 2.5 MG tablet Take 1 tablet (2.5 mg) by mouth 2 times daily      metoprolol succinate ER (TOPROL XL) 25 MG 24 hr tablet Take 0.5 tablets (12.5 mg) by mouth daily      mirtazapine (REMERON) 7.5 MG tablet Take 1 tablet (7.5 mg) by mouth At Bedtime      multivitamin w/minerals (THERA-VIT-M) tablet Take 1 tablet by mouth daily         ROS:  Unable to obtain due to cognitive impairment or aphasia. BIMS 2/15.     PHYSICAL EXAM:  BP (!) 145/70   Pulse 65   Temp 98  F (36.7  C)   Resp 17   Ht 1.6 m (5' 3\")   Wt 43.7 kg (96 lb 4.8 oz)   SpO2 94%   BMI 17.06 kg/m    Gen: sitting in wheelchair, alert, cooperative and in no acute distress  Card: RRR, S1, S2, no murmurs  Resp: lungs clear to auscultation bilaterally, no crackles or wheezes  Ext: mild dependent LE edema with stasis changes   Neuro: CX II-XII grossly in tact; ROM in all four extremities grossly in tact  Psych: memory, judgement and insight impaired    LABORATORY/IMAGING DATA:  Reviewed as per [facility] and/or [facility]    ASSESSMENT/PLAN:    Dementia Without Behavioral Disturbance  BIMS 2/15 and SLUMS 3/30.   -- will need memory care vs SNF at discharge  -- OT following    Paroxysmal Atrial Fibrillation - NEW Diagnosis  HR 60s-70s. New on beta blocker and DOAC  -- metoprolol XL 12.5 mg daily   -- apixaban 2.5 mg BID    Thoracic Pressure Ulcers  Present on admission secondary to being found on a toilet.   -- wound cares as ordered    Failure to Thrive  Protein Calorie Malnutrition  BMI 17  -- new on mirtazapine 7.5 mg at bedtime at TCU on 9/6/23  -- nutrition following    Bilateral LE Edema  In setting of malnutrition as well as IV fluids for rhabdo. Weight 101 --> 99 --> 92 lbs. Mild dependent today.   -- follow clinically     Thoracic Inlet Soft Tissue Density  Incidental finding. Differential of adenopathy vs " lymphoproliferative etiology vs lymphoma  -- noted; family elected no further work up given her age    Physical Deconditioning  In setting of hospitalization and underlying medical conditions  -- ongoing PT/OT    Electronically signed by:  Olesya Boo MD              Documentation of Face-to-Face and Certification for Home Health Services     Patient: Elham Manning   YOB: 1928  MR Number: [id number]  Today's Date: 9/20/2023    I certify that patient: Elham Manning is under my care and that I, or a nurse practitioner or physician's assistant working with me, had a face-to-face encounter that meets the physician face-to-face encounter requirements with this patient on: 9/20/2023.    This encounter with the patient was in whole, or in part, for the following medical condition, which is the primary reason for home health care: dementia, skin wounds from pressure prior to admission, gait disturbance, physical deconditioning.    I certify that, based on my findings, the following services are medically necessary home health services:  Nursing, Occupational Therapy, and Physical Therapy.    My clinical findings support the need for the above services because: Nursing is needed for wound care. Occupational Therapy Services are needed to assess and treat cognitive ability and address ADL safety due to impairment in ongoing strength, balance, endurance and ADLs. and Physical Therapy Services are needed to assess and treat the following functional impairments: ongoing strength, balance, endurance and ADLs.    Further, I certify that my clinical findings support that this patient is homebound (i.e. absences from home require considerable and taxing effort and are for medical reasons or [facility] services or infrequently or of short duration when for other reasons) because: unable to leave home without assistance     Based on the above findings. I certify that this patient is confined to the home and needs  intermittent skilled nursing care, physical therapy and/or speech therapy.  The patient is under my care, and I have initiated the establishment of the plan of care.  This patient will be followed by a physician who will periodically review the plan of care.  Physician/Provider to provide follow up care: No Ref-Primary, Physician    Responsible Medicare certified [employer] Physician: Olesya Boo MD  Electronically Signed by: Olesya Boo MD    Physician Signature: See electronic signature associated with these discharge orders.  Date: 9/20/2023          [facility] DME Order/Prescription    Date: September 20, 2023  Patient: Elham Manning, 5/8/1928    DME ordered:  Manual wheelchair  Size: 18 x 18 standard  Leg rests: standard elevating  Arm rests: standard  Cushion: standard    The patient has mobility limitation that significantly impairs their ability to participate in one or more mobility related activities (MRADLs): Toileting, Feeding, Grooming and Bathing due to dementia, failure to thrive, physical deconditioning.   The patient's mobility limitations cannot be safely resolved by using a cane/walker.   Patient and/or caregiver is able to safely use the manual wheelchair. Patient's functional mobility deficit can be sufficiently resolved by the use of a manual wheelchair.   Patient's home provides adequate access between rooms, maneuvering space, and surfaces for use of a manual wheelchair. Use of a manual wheelchair will significantly improve the patient's ability to participate in MRADLs.   The patient will use it on a regular basis in the home. T  he patient has not expressed unwillingness to use a manual wheelchair in the home.     Needing above DME for : Lifetime    (R62.7) Adult failure to thrive  (primary encounter diagnosis)  (E46) Protein-calorie malnutrition, unspecified severity (H)  (L89.109) Pressure injury of skin of back, unspecified injury stage  (R53.81) Physical  deconditioning  (F03.90) Dementia without behavioral disturbance, psychotic disturbance, mood disturbance, or anxiety, unspecified dementia severity, unspecified dementia type (H)      ELECTRONICALLY SIGNED BY [employer] CERTIFIED PROVIDER:  Olesya Boo MD   NPI: [id number]  [facility]  [address] [place] [id number]